# Patient Record
Sex: FEMALE | Race: BLACK OR AFRICAN AMERICAN | Employment: FULL TIME | URBAN - METROPOLITAN AREA
[De-identification: names, ages, dates, MRNs, and addresses within clinical notes are randomized per-mention and may not be internally consistent; named-entity substitution may affect disease eponyms.]

---

## 2019-05-14 ENCOUNTER — HOSPITAL ENCOUNTER (EMERGENCY)
Age: 39
Discharge: HOME | End: 2019-05-14
Attending: EMERGENCY MEDICINE
Payer: COMMERCIAL

## 2019-05-14 ENCOUNTER — APPOINTMENT (OUTPATIENT)
Dept: ULTRASOUND IMAGING | Age: 39
End: 2019-05-14
Attending: PHYSICIAN ASSISTANT
Payer: COMMERCIAL

## 2019-05-14 VITALS
HEIGHT: 67 IN | DIASTOLIC BLOOD PRESSURE: 97 MMHG | RESPIRATION RATE: 16 BRPM | OXYGEN SATURATION: 100 % | WEIGHT: 252.21 LBS | TEMPERATURE: 98.2 F | HEART RATE: 83 BPM | BODY MASS INDEX: 39.58 KG/M2 | SYSTOLIC BLOOD PRESSURE: 141 MMHG

## 2019-05-14 DIAGNOSIS — N93.9 ABNORMAL UTERINE BLEEDING: Primary | ICD-10-CM

## 2019-05-14 LAB
ANION GAP SERPL CALC-SCNC: 3 MMOL/L (ref 5–15)
APPEARANCE UR: CLEAR
BACTERIA URNS QL MICRO: NEGATIVE /HPF
BASOPHILS # BLD: 0.1 K/UL (ref 0–0.1)
BASOPHILS NFR BLD: 1 % (ref 0–1)
BILIRUB UR QL: NEGATIVE
BUN SERPL-MCNC: 8 MG/DL (ref 6–20)
BUN/CREAT SERPL: 11 (ref 12–20)
CALCIUM SERPL-MCNC: 8.4 MG/DL (ref 8.5–10.1)
CHLORIDE SERPL-SCNC: 105 MMOL/L (ref 97–108)
CO2 SERPL-SCNC: 30 MMOL/L (ref 21–32)
COLOR UR: NORMAL
CREAT SERPL-MCNC: 0.73 MG/DL (ref 0.55–1.02)
DIFFERENTIAL METHOD BLD: NORMAL
EOSINOPHIL # BLD: 0.2 K/UL (ref 0–0.4)
EOSINOPHIL NFR BLD: 3 % (ref 0–7)
EPITH CASTS URNS QL MICRO: NORMAL /LPF
ERYTHROCYTE [DISTWIDTH] IN BLOOD BY AUTOMATED COUNT: 12.9 % (ref 11.5–14.5)
GLUCOSE SERPL-MCNC: 93 MG/DL (ref 65–100)
GLUCOSE UR STRIP.AUTO-MCNC: NEGATIVE MG/DL
HCG UR QL: NEGATIVE
HCT VFR BLD AUTO: 35.9 % (ref 35–47)
HGB BLD-MCNC: 11.7 G/DL (ref 11.5–16)
HGB UR QL STRIP: NEGATIVE
HYALINE CASTS URNS QL MICRO: NORMAL /LPF (ref 0–5)
IMM GRANULOCYTES # BLD AUTO: 0 K/UL (ref 0–0.04)
IMM GRANULOCYTES NFR BLD AUTO: 0 % (ref 0–0.5)
KETONES UR QL STRIP.AUTO: NEGATIVE MG/DL
LEUKOCYTE ESTERASE UR QL STRIP.AUTO: NEGATIVE
LYMPHOCYTES # BLD: 2.5 K/UL (ref 0.8–3.5)
LYMPHOCYTES NFR BLD: 35 % (ref 12–49)
MCH RBC QN AUTO: 27 PG (ref 26–34)
MCHC RBC AUTO-ENTMCNC: 32.6 G/DL (ref 30–36.5)
MCV RBC AUTO: 82.9 FL (ref 80–99)
MONOCYTES # BLD: 0.6 K/UL (ref 0–1)
MONOCYTES NFR BLD: 9 % (ref 5–13)
NEUTS SEG # BLD: 3.6 K/UL (ref 1.8–8)
NEUTS SEG NFR BLD: 52 % (ref 32–75)
NITRITE UR QL STRIP.AUTO: NEGATIVE
NRBC # BLD: 0 K/UL (ref 0–0.01)
NRBC BLD-RTO: 0 PER 100 WBC
PH UR STRIP: 7.5 [PH] (ref 5–8)
PLATELET # BLD AUTO: 386 K/UL (ref 150–400)
PMV BLD AUTO: 10.4 FL (ref 8.9–12.9)
POTASSIUM SERPL-SCNC: 4 MMOL/L (ref 3.5–5.1)
PROT UR STRIP-MCNC: NEGATIVE MG/DL
RBC # BLD AUTO: 4.33 M/UL (ref 3.8–5.2)
RBC #/AREA URNS HPF: NORMAL /HPF (ref 0–5)
SODIUM SERPL-SCNC: 138 MMOL/L (ref 136–145)
SP GR UR REFRACTOMETRY: 1.02 (ref 1–1.03)
UA: UC IF INDICATED,UAUC: NORMAL
UROBILINOGEN UR QL STRIP.AUTO: 1 EU/DL (ref 0.2–1)
WBC # BLD AUTO: 7 K/UL (ref 3.6–11)
WBC URNS QL MICRO: NORMAL /HPF (ref 0–4)

## 2019-05-14 PROCEDURE — 80048 BASIC METABOLIC PNL TOTAL CA: CPT

## 2019-05-14 PROCEDURE — 99284 EMERGENCY DEPT VISIT MOD MDM: CPT

## 2019-05-14 PROCEDURE — 36415 COLL VENOUS BLD VENIPUNCTURE: CPT

## 2019-05-14 PROCEDURE — 81001 URINALYSIS AUTO W/SCOPE: CPT

## 2019-05-14 PROCEDURE — 76830 TRANSVAGINAL US NON-OB: CPT

## 2019-05-14 PROCEDURE — 81025 URINE PREGNANCY TEST: CPT

## 2019-05-14 PROCEDURE — 85025 COMPLETE CBC W/AUTO DIFF WBC: CPT

## 2019-05-14 RX ORDER — MEDROXYPROGESTERONE ACETATE 10 MG/1
10 TABLET ORAL DAILY
Qty: 10 TAB | Refills: 0 | Status: SHIPPED | OUTPATIENT
Start: 2019-05-14 | End: 2019-05-24

## 2019-05-15 NOTE — DISCHARGE INSTRUCTIONS
Patient Education        Abnormal Uterine Bleeding: Care Instructions  Your Care Instructions    Abnormal uterine bleeding (AUB) is irregular bleeding from the uterus that is longer or heavier than usual or does not occur at your regular time. Sometimes it is caused by changes in hormone levels. It can also be caused by growths in the uterus, such as fibroids or polyps. Sometimes a cause cannot be found. You may have heavy bleeding when you are not expecting your period. Your doctor may suggest a pregnancy test, if you think you are pregnant. Follow-up care is a key part of your treatment and safety. Be sure to make and go to all appointments, and call your doctor if you are having problems. It's also a good idea to know your test results and keep a list of the medicines you take. How can you care for yourself at home? · Be safe with medicines. Take pain medicines exactly as directed. ? If the doctor gave you a prescription medicine for pain, take it as prescribed. ? If you are not taking a prescription pain medicine, ask your doctor if you can take an over-the-counter medicine. · You may be low in iron because of blood loss. Eat a balanced diet that is high in iron and vitamin C. Foods rich in iron include red meat, shellfish, eggs, beans, and leafy green vegetables. Talk to your doctor about whether you need to take iron pills or a multivitamin. When should you call for help? Call 911 anytime you think you may need emergency care. For example, call if:    · You passed out (lost consciousness).    Call your doctor now or seek immediate medical care if:    · You have new or worse belly or pelvic pain.     · You have severe vaginal bleeding.     · You feel dizzy or lightheaded, or you feel like you may faint.    Watch closely for changes in your health, and be sure to contact your doctor if:    · You think you may be pregnant.     · Your bleeding gets worse.     · You do not get better as expected.    Where can you learn more? Go to http://mingo-ana.info/. Enter J726 in the search box to learn more about \"Abnormal Uterine Bleeding: Care Instructions. \"  Current as of: May 14, 2018  Content Version: 11.9  © 7608-3913 [employer]. Care instructions adapted under license by [employer] (which disclaims liability or warranty for this information). If you have questions about a medical condition or this instruction, always ask your healthcare professional. [place] [address] any warranty or liability for your use of this information.

## 2019-05-15 NOTE — ED PROVIDER NOTES
EMERGENCY DEPARTMENT HISTORY AND PHYSICAL EXAM 
 
 
Date: 5/14/2019 Patient Name: Chan Tavares History of Presenting Illness Chief Complaint Patient presents with  Vaginal Bleeding  
  pt reports vaginal bleeding x2 months History Provided By: Patient HPI: Chan Tavares, 42 y.o. female with PMHx significant for nothing, presents to the ED with cc of vaginal bleeding. Patient states that she has been having vaginal bleeding for the last 2 months. Notes that she typically has very heavy periods. States that she sees a OB/GYN but states that for unclear reason she has not been started on birth control to regulate her periods. States that she has been going through a pad every hour for the last 2 months, however notes that her bleeding has slowed down today. She does take iron supplements for iron deficiency anemia and notes some chronic fatigue that has not changed from prior. No chest pain, cough or cold symptoms, urinary symptoms. PCP: UNKNOWN There are no other complaints, changes, or physical findings at this time. Current Outpatient Medications Medication Sig Dispense Refill  ibuprofen (MOTRIN IB) 200 mg tablet Take  by mouth.  cyclobenzaprine (FLEXERIL) 5 mg tablet Take 1 Tab by mouth nightly. 30 Tab 0 Past History Past Medical History: No past medical history on file. Past Surgical History: No past surgical history on file. Family History: No family history on file. Social History: 
Social History Tobacco Use  Smoking status: Never Smoker Substance Use Topics  Alcohol use: Not on file  Drug use: Not on file Allergies: 
No Known Allergies Review of Systems Review of Systems Constitutional: Negative for chills and fever. HENT: Negative for congestion, rhinorrhea and sore throat. Respiratory: Negative for cough and shortness of breath. Cardiovascular: Negative for chest pain. Gastrointestinal: Negative for abdominal pain, nausea and vomiting. Genitourinary: Positive for vaginal bleeding. Negative for dysuria and urgency. Skin: Negative for rash. Neurological: Negative for dizziness, light-headedness and headaches. All other systems reviewed and are negative. Physical Exam  
Physical Exam  
Constitutional: She is oriented to person, place, and time. She appears well-developed and well-nourished. No distress. HENT:  
Head: Normocephalic and atraumatic. Eyes: Pupils are equal, round, and reactive to light. Conjunctivae and EOM are normal.  
Neck: Normal range of motion. Cardiovascular: Normal rate, regular rhythm and intact distal pulses. Pulmonary/Chest: Effort normal and breath sounds normal. No stridor. No respiratory distress. Abdominal: Soft. She exhibits no distension. There is no tenderness. Musculoskeletal: Normal range of motion. Neurological: She is alert and oriented to person, place, and time. Skin: Skin is warm and dry. Psychiatric: She has a normal mood and affect. Nursing note and vitals reviewed. Diagnostic Study Results Labs - Recent Results (from the past 12 hour(s)) URINALYSIS W/ REFLEX CULTURE Collection Time: 05/14/19  6:31 PM  
Result Value Ref Range Color YELLOW/STRAW Appearance CLEAR CLEAR Specific gravity 1.022 1.003 - 1.030    
 pH (UA) 7.5 5.0 - 8.0 Protein NEGATIVE  NEG mg/dL Glucose NEGATIVE  NEG mg/dL Ketone NEGATIVE  NEG mg/dL Bilirubin NEGATIVE  NEG Blood NEGATIVE  NEG Urobilinogen 1.0 0.2 - 1.0 EU/dL Nitrites NEGATIVE  NEG Leukocyte Esterase NEGATIVE  NEG    
 WBC 0-4 0 - 4 /hpf  
 RBC 0-5 0 - 5 /hpf Epithelial cells FEW FEW /lpf Bacteria NEGATIVE  NEG /hpf  
 UA:UC IF INDICATED CULTURE NOT INDICATED BY UA RESULT CNI Hyaline cast 0-2 0 - 5 /lpf  
HCG URINE, QL. - POC Collection Time: 05/14/19  6:35 PM  
Result Value Ref Range Pregnancy test,urine (POC) NEGATIVE  NEG    
CBC WITH AUTOMATED DIFF Collection Time: 05/14/19  7:42 PM  
Result Value Ref Range WBC 7.0 3.6 - 11.0 K/uL  
 RBC 4.33 3.80 - 5.20 M/uL  
 HGB 11.7 11.5 - 16.0 g/dL HCT 35.9 35.0 - 47.0 % MCV 82.9 80.0 - 99.0 FL  
 MCH 27.0 26.0 - 34.0 PG  
 MCHC 32.6 30.0 - 36.5 g/dL  
 RDW 12.9 11.5 - 14.5 % PLATELET 762 789 - 711 K/uL MPV 10.4 8.9 - 12.9 FL  
 NRBC 0.0 0  WBC ABSOLUTE NRBC 0.00 0.00 - 0.01 K/uL NEUTROPHILS 52 32 - 75 % LYMPHOCYTES 35 12 - 49 % MONOCYTES 9 5 - 13 % EOSINOPHILS 3 0 - 7 % BASOPHILS 1 0 - 1 % IMMATURE GRANULOCYTES 0 0.0 - 0.5 % ABS. NEUTROPHILS 3.6 1.8 - 8.0 K/UL  
 ABS. LYMPHOCYTES 2.5 0.8 - 3.5 K/UL  
 ABS. MONOCYTES 0.6 0.0 - 1.0 K/UL  
 ABS. EOSINOPHILS 0.2 0.0 - 0.4 K/UL  
 ABS. BASOPHILS 0.1 0.0 - 0.1 K/UL  
 ABS. IMM. GRANS. 0.0 0.00 - 0.04 K/UL  
 DF AUTOMATED METABOLIC PANEL, BASIC Collection Time: 05/14/19  7:42 PM  
Result Value Ref Range Sodium 138 136 - 145 mmol/L Potassium 4.0 3.5 - 5.1 mmol/L Chloride 105 97 - 108 mmol/L  
 CO2 30 21 - 32 mmol/L Anion gap 3 (L) 5 - 15 mmol/L Glucose 93 65 - 100 mg/dL BUN 8 6 - 20 MG/DL Creatinine 0.73 0.55 - 1.02 MG/DL  
 BUN/Creatinine ratio 11 (L) 12 - 20 GFR est AA >60 >60 ml/min/1.73m2 GFR est non-AA >60 >60 ml/min/1.73m2 Calcium 8.4 (L) 8.5 - 10.1 MG/DL Radiologic Studies -  
US TRANSVAGINAL Final Result IMPRESSION:   
1. Transvaginal pelvic ultrasound revealing tiny amount of fluid in the  
endometrium. Minimal blood or polyp in the endocervical canal. Correlate with  
physical examination. Uterine fundal leiomyoma. Normal ovaries bilaterally. Us Transvaginal 
 
Result Date: 5/14/2019 IMPRESSION: 1. Transvaginal pelvic ultrasound revealing tiny amount of fluid in the endometrium. Minimal blood or polyp in the endocervical canal. Correlate with physical examination. Uterine fundal leiomyoma. Normal ovaries bilaterally. Medical Decision Making I am the first provider for this patient. I reviewed the vital signs, available nursing notes, past medical history, past surgical history, family history and social history. Vital Signs-Reviewed the patient's vital signs. Patient Vitals for the past 12 hrs: 
 Temp Pulse Resp BP SpO2  
05/14/19 1820 98.2 °F (36.8 °C) 83 16 (!) 141/97 100 % Records Reviewed: Nursing Notes Provider Notes (Medical Decision Making):  
Patient presents with dysfunctional uterine bleeding. On exam, she is well-appearing with stable vital signs. Plan for basic lab work to rule out significant anemia related to her vaginal bleeding. We will also check pelvic ultrasound. ED Course:  
Initial assessment performed. The patients presenting problems have been discussed, and they are in agreement with the care plan formulated and outlined with them. I have encouraged them to ask questions as they arise throughout their visit. Hemoglobin is stable. Pelvic ultrasound shows blood versus a cervical polyp. Patient offered pelvic exam and declined stating that she will follow-up with OB/GYN. Will start her on Provera strict instructions to follow-up with OB for further management. She does request the name of a new OB/GYN which I will provide for her. Return precautions were discussed. Critical Care: 
none Disposition: 
Discharge Note: 
9:24 PM 
The patient has been re-evaluated and is ready for discharge. Reviewed available results with patient. Counseled patient on diagnosis and care plan. Patient has expressed understanding, and all questions have been answered. Patient agrees with plan and agrees to follow up as recommended, or to return to the ED if their symptoms worsen. Discharge instructions have been provided and explained to the patient, along with reasons to return to the ED. PLAN: 
1.   
Discharge Medication List as of 5/14/2019  9:24 PM  
 START taking these medications Details  
medroxyPROGESTERone (PROVERA) 10 mg tablet Take 1 Tab by mouth daily for 10 days. , Print, Disp-10 Tab, R-0  
  
  
CONTINUE these medications which have NOT CHANGED Details  
ibuprofen (MOTRIN IB) 200 mg tablet Take  by mouth., Historical Med  
  
cyclobenzaprine (FLEXERIL) 5 mg tablet Take 1 Tab by mouth nightly., Normal, Disp-30 Tab, R-0  
  
  
 
2. Follow-up Information Follow up With Specialties Details Why Contact Info Julia Beck MD Obstetrics & Gynecology, Gynecology, Obstetrics Schedule an appointment as soon as possible for a visit  [address] [place] [address] 
[phone] [facility] EMERGENCY DEPT Emergency Medicine  As needed, If symptoms worsen [address] [place] [address] Drive [address] [facility] 
[phone] Return to ED if worse Diagnosis Clinical Impression: 1. Abnormal uterine bleeding This note will not be viewable in [address]

## 2019-05-15 NOTE — ED NOTES
Dr Mera Fabian,  has reviewed discharge instructions with the patient. The patient verbalized understanding. Pt. A&Ox4, respirations even and unlabored. VS stable as noted in flowsheet. Pt Declined wheelchair assist from department; paperwork in hand.

## 2020-02-07 ENCOUNTER — HOSPITAL ENCOUNTER (EMERGENCY)
Age: 40
Discharge: HOME | End: 2020-02-07
Attending: EMERGENCY MEDICINE
Payer: COMMERCIAL

## 2020-02-07 VITALS
WEIGHT: 237.44 LBS | OXYGEN SATURATION: 100 % | RESPIRATION RATE: 16 BRPM | HEART RATE: 72 BPM | HEIGHT: 65 IN | BODY MASS INDEX: 39.56 KG/M2 | TEMPERATURE: 97.9 F | DIASTOLIC BLOOD PRESSURE: 80 MMHG | SYSTOLIC BLOOD PRESSURE: 142 MMHG

## 2020-02-07 DIAGNOSIS — K08.89 PAIN, DENTAL: Primary | ICD-10-CM

## 2020-02-07 PROCEDURE — 99283 EMERGENCY DEPT VISIT LOW MDM: CPT

## 2020-02-07 PROCEDURE — 75810000283 HC INJECTION NERVE BLOCK

## 2020-02-07 PROCEDURE — 74011250637 HC RX REV CODE- 250/637: Performed by: EMERGENCY MEDICINE

## 2020-02-07 PROCEDURE — 74011000250 HC RX REV CODE- 250: Performed by: EMERGENCY MEDICINE

## 2020-02-07 RX ORDER — BUPIVACAINE HYDROCHLORIDE 5 MG/ML
2 INJECTION, SOLUTION EPIDURAL; INTRACAUDAL ONCE
Status: COMPLETED | OUTPATIENT
Start: 2020-02-07 | End: 2020-02-07

## 2020-02-07 RX ORDER — PENICILLIN V POTASSIUM 500 MG/1
500 TABLET, FILM COATED ORAL 4 TIMES DAILY
Qty: 28 TAB | Refills: 0 | Status: SHIPPED | OUTPATIENT
Start: 2020-02-07 | End: 2020-02-14

## 2020-02-07 RX ORDER — NAPROXEN 500 MG/1
500 TABLET ORAL
Qty: 20 TAB | Refills: 0 | Status: SHIPPED | OUTPATIENT
Start: 2020-02-07 | End: 2021-05-05 | Stop reason: ALTCHOICE

## 2020-02-07 RX ORDER — PENICILLIN V POTASSIUM 500 MG/1
500 TABLET, FILM COATED ORAL 4 TIMES DAILY
Qty: 28 TAB | Refills: 0 | OUTPATIENT
Start: 2020-02-07 | End: 2020-02-07

## 2020-02-07 RX ORDER — NAPROXEN 500 MG/1
500 TABLET ORAL
Qty: 20 TAB | Refills: 0 | OUTPATIENT
Start: 2020-02-07 | End: 2020-02-07

## 2020-02-07 RX ORDER — GABAPENTIN 100 MG/1
100 CAPSULE ORAL ONCE
Status: COMPLETED | OUTPATIENT
Start: 2020-02-07 | End: 2020-02-07

## 2020-02-07 RX ADMIN — BUPIVACAINE HYDROCHLORIDE 2 MG: 5 INJECTION, SOLUTION EPIDURAL; INTRACAUDAL at 06:00

## 2020-02-07 RX ADMIN — GABAPENTIN 100 MG: 100 CAPSULE ORAL at 06:00

## 2020-02-07 NOTE — DISCHARGE INSTRUCTIONS
Patient Education   Emergency [address]  [address] [facility], [address]   Phone: [phone]    [facility] [address], [facility]   Phone: [phone], select option (2) to confirm time for treatment     [facility]  [address] [facility], [facility]   Monday-Friday: 8am-4pm  Phone: [phone] of Dentistry Urgent [address] [facility], [address] [facility], [facility] [place] [address]  Phone: [phone] to confirm a time for emergency treatment  Pediatrics: [phone]     [facility]  Phone: [phone]    [facility]  [address], [address] [address]   Phone [phone] or [phone]  Hours [id number] (extractions)  Simple tooth extraction: $60 per tooth, $55 per x-ray     Irina Aleman the [facility] (in [place])  [facility] only  Phone: [phone], leave message saying you need an appointment to register  Hours: Wed 6-9p     [facility]  Disabled and over age 58 only  Phone: [phone]    Non-Urgent [facility] ([facility])  [address] [facility], [place], [facility] [address]  Phone: [phone]     [facility] [address] [facility] [address] [facility]   Dental Clinic: [phone]  Oral Surgery Clinic: [phone]         Local Dentists    [facility]  [address] [facility] [address]  [facility] Sweta Rosales DDS  [facility]    Radu Villafana, DDS  [facility] [address]  [phone]    Uriah Pro  [address] [facility]  [phone]               Tooth and Gum Pain: Care Instructions  Your Care Instructions    The most common causes of dental pain are tooth decay and gum disease. Pain can also be caused by an infection of the tooth (abscess) or the gums. Or you may have pain from a broken or cracked tooth.  Other causes of pain include infection and damage to a tooth from nervous grinding of your teeth. A wisdom tooth can be painful when it is coming in but cannot break through the gum. It can also be painful when the tooth is only partway in and extra gum tissue has formed around it. The tissue can get inflamed (pericoronitis), and sometimes it gets infected. Prompt dental care can help find the cause of your toothache and keep the tooth from dying or gum disease from getting worse. Self-care at home may reduce your pain and discomfort. Follow-up care is a key part of your treatment and safety. Be sure to make and go to all appointments, and call your dentist or doctor if you are having problems. It's also a good idea to know your test results and keep a list of the medicines you take. How can you care for yourself at home? · To reduce pain and facial swelling, put an ice or cold pack on the outside of your cheek for 10 to 20 minutes at a time. Put a thin cloth between the ice and your skin. Do not use heat. · If your doctor prescribed antibiotics, take them as directed. Do not stop taking them just because you feel better. You need to take the full course of antibiotics. · Ask your doctor if you can take an over-the-counter pain medicine, such as acetaminophen (Tylenol), ibuprofen (Advil, Motrin), or naproxen (Aleve). Be safe with medicines. Read and follow all instructions on the label. · Avoid very hot, cold, or sweet foods and drinks if they increase your pain. · Rinse your mouth with warm salt water every 2 hours to help relieve pain and swelling. Mix 1 teaspoon of salt in 8 ounces of water. · Talk to your dentist about using special toothpaste for sensitive teeth. To reduce pain on contact with heat or cold or when brushing, brush with this toothpaste regularly or rub a small amount of the paste on the sensitive area with a clean finger 2 or 3 times a day. Floss gently between your teeth.   · Do not smoke or use spit tobacco. Tobacco use can make gum problems worse, decreases your ability to fight infection in your gums, and delays healing. If you need help quitting, talk to your doctor about stop-smoking programs and medicines. These can increase your chances of quitting for good. When should you call for help? Call 911 anytime you think you may need emergency care. For example, call if:    · You have trouble breathing.    Call your dentist or doctor now or seek immediate medical care if:    · You have signs of infection, such as:  ? Increased pain, swelling, warmth, or redness. ? Red streaks leading from the area. ? Pus draining from the area. ? A fever.    Watch closely for changes in your health, and be sure to contact your doctor if:    · You do not get better as expected. Where can you learn more? Go to http://mingo-ana.info/. Enter 0363 [id number] in the search box to learn more about \"Tooth and Gum Pain: Care Instructions. \"  Current as of: October 3, 2018  Content Version: 12.2  © 1605-3265 [employer]. Care instructions adapted under license by [employer] (which disclaims liability or warranty for this information). If you have questions about a medical condition or this instruction, always ask your healthcare professional. [address] any warranty or liability for your use of this information.

## 2020-02-07 NOTE — ED TRIAGE NOTES
Assumed care of patient. Patient reports history of four teeth removed to upper right mouth in November. Reports lower right mouth pain started x3 days ago. Reports increased swelling, cold sensitivity, and radiating pain to jaw region. Denies drainage or broken teeth. AxOx4. Swelling noted to exterior of right lower jaw. Redness and swelling to right lower gum line.

## 2020-02-07 NOTE — ED TRIAGE NOTES
Pt states she has had severe mouth/gum pan for 3 days and thinks it may be related to having a 4 teeth pulled in November. Pt states she saw dentist and was told they could not find anything wrong and was told if not better to see the dentist that pulled the teeth.

## 2020-02-07 NOTE — ED PROVIDER NOTES
EMERGENCY DEPARTMENT HISTORY AND PHYSICAL EXAM      Date: 2/7/2020  Patient Name: Leatha Arnold    History of Presenting Illness     Chief Complaint   Patient presents with    Dental Pain       History Provided By: Patient    HPI: Leatha Arnold, 42 y.o. female with PMHx as noted below presents the emergency department with chief complaint of dental pain. Patient notes pain in the gum adjacent to her first and second molars on the lower right side. She noted the pain became more severe yesterday and has been constant. Describes it as an aching sensation that is worse with eating and chewing. Somewhat relieved by over-the-counter medications. No neck stiffness, fevers, chills, focal neurologic deficits, headaches. PCP: UNKNOWN    Current Outpatient Medications   Medication Sig Dispense Refill    naproxen (NAPROSYN) 500 mg tablet Take 1 Tab by mouth every twelve (12) hours as needed for Pain. 20 Tab 0    penicillin v potassium (VEETID) 500 mg tablet Take 1 Tab by mouth four (4) times daily for 7 days. 28 Tab 0    ibuprofen (MOTRIN IB) 200 mg tablet Take  by mouth.  cyclobenzaprine (FLEXERIL) 5 mg tablet Take 1 Tab by mouth nightly. 30 Tab 0       Past History     Past Medical History:  Reviewed, no pertinent past medical history      Social History:  Denies heavy alcohol or illicit drugs  Allergies:  No Known Allergies      Review of Systems   Review of Systems  Constitutional: Negative for fever, chills, and fatigue. HENT: Negative for congestion, sore throa and neck stiffness   Eyes: Negative for discharge and redness. Respiratory: Negative for  shortness of breath, wheezing   Cardiovascular: Negative for chest pain, palpitations   Gastrointestinal: Negative for nausea, vomiting, abdominal pain, constipation,   Genitourinary: Negative for dysuria, hematuria,   Musculoskeletal: Negative for myalgias or joint pain . Skin: Negative for rash or lesions .    Neurological: Negative weakness, light-headedness, numbness and headaches. Physical Exam   Physical Exam    GENERAL: alert and oriented, no acute distress  EYES: PEERL, No injection, discharge or icterus. ENT: Mucous membranes pink and moist.  Soft tissues within normal limits, face is symmetric, no fluctuance or erythema of the gingiva  NECK: Supple  LUNGS: Airway patent. Non-labored respirations. Breath sounds clear with good air entry bilaterally. HEART: Regular rate and rhythm. No peripheral edema  ABDOMEN: Non-distended and non-tender, without guarding or rebound. SKIN:  warm, dry  MSK/EXTREMITIES: Without swelling, tenderness or deformity, symmetric with normal ROM  NEUROLOGICAL: Alert, oriented      Diagnostic Study Results     Labs -   No results found for this or any previous visit (from the past 12 hour(s)). Radiologic Studies -   No orders to display     CT Results  (Last 48 hours)    None        CXR Results  (Last 48 hours)    None            Medical Decision Making   I am the first provider for this patient. I reviewed the vital signs, available nursing notes, past medical history, past surgical history, family history and social history. Vital Signs-Reviewed the patient's vital signs. Patient Vitals for the past 12 hrs:   Temp Pulse Resp BP SpO2   02/07/20 0657 97.9 °F (36.6 °C) 72 16 142/80 100 %   02/07/20 0519 97.6 °F (36.4 °C) 77 17 (!) 145/96 100 %           Records Reviewed: Nursing Notes and Old Medical Records    Provider Notes (Medical Decision Making): On presentation the patient is well appearing, in no acute distress with normal vital signs. Based on the history and exam the differential diagnosis for this patient includes pulpitis, dental carries, tooth decay, dental abscess. No signs of abscess on exam. There is no trismus, soft tissue is within normal limits, the uvula is midline, there is no meningismus and no gross neurologic deficits observed.  Supportive care discussed and patient instructed to follow up with a dentist. Referral form provided. Patient will be started on antibiotics      ED Course:   Initial assessment performed. The patients presenting problems have been discussed, and they are in agreement with the care plan formulated and outlined with them. I have encouraged them to ask questions as they arise throughout their visit. Procedure Note - Dental Block:    Performed by Cande Garg MD  A inferior alveolar nerve block was performed on the right side. 2 mL of 0.5% bupivicaine was injected after negative aspiration. Total time at bedside, performing this procedure was 1-15 minutes. The procedure was tolerated well without any complications. PROGRESS NOTE:  The patient has been re-evaluated and is ready for discharge. Reviewed available results with patient and have counseled them on diagnosis and care plan. They have expressed understanding, and all their questions have been answered. They agree with plan and agree to have pt F/U as recommended, or return to the ED if their sxs worsen. Discharge instructions have been provided and explained to them, along with reasons to have pt return to the ED. The patient is amenable to discharge so will discharge pt at this time      Disposition:  home    PLAN:  1. Discharge Medication List as of 2/7/2020  7:07 AM      START taking these medications    Details   naproxen (NAPROSYN) 500 mg tablet Take 1 Tab by mouth every twelve (12) hours as needed for Pain., Normal, Disp-20 Tab, R-0      penicillin v potassium (VEETID) 500 mg tablet Take 1 Tab by mouth four (4) times daily for 7 days. , Normal, Disp-28 Tab, R-0         CONTINUE these medications which have NOT CHANGED    Details   ibuprofen (MOTRIN IB) 200 mg tablet Take  by mouth., Historical Med      cyclobenzaprine (FLEXERIL) 5 mg tablet Take 1 Tab by mouth nightly., Normal, Disp-30 Tab, R-0           2.    Follow-up Information     Follow up With Specialties Details Why Contact Info    [facility] EMERGENCY DEPT Emergency Medicine  If symptoms worsen [address] [address]  [address] [facility] Dent[facility] Schedule an appointment as soon as possible for a visit in 1 day  [facility]  [phone]        Return to ED if worse     Diagnosis     Clinical Impression:   1. Pain, dental        Please note that this dictation was completed with Dragon, computer voice recognition software. Quite often unanticipated grammatical, syntax, homophones, and other interpretive errors are inadvertently transcribed by the computer software. Please disregard these errors. Additionally, please excuse any errors that have escaped final proofreading.

## 2020-02-07 NOTE — ED NOTES
[facility]. Pt is resting in bed with no complaints at this time, no acute distress noted. Pt reports decreased pain after dental block    0720  D/C papers given to and signed by pt, verbalizes understanding.   Pt D/C home with steady gait

## 2020-10-21 ENCOUNTER — OFFICE VISIT (OUTPATIENT)
Dept: NEUROLOGY | Age: 40
End: 2020-10-21
Payer: COMMERCIAL

## 2020-10-21 VITALS
OXYGEN SATURATION: 99 % | WEIGHT: 237 LBS | DIASTOLIC BLOOD PRESSURE: 80 MMHG | SYSTOLIC BLOOD PRESSURE: 110 MMHG | HEART RATE: 88 BPM | BODY MASS INDEX: 39.44 KG/M2

## 2020-10-21 DIAGNOSIS — G50.0 TRIGEMINAL NEURALGIA OF RIGHT SIDE OF FACE: Primary | ICD-10-CM

## 2020-10-21 PROCEDURE — 99205 OFFICE O/P NEW HI 60 MIN: CPT | Performed by: PSYCHIATRY & NEUROLOGY

## 2020-10-21 RX ORDER — PREDNISOLONE ACETATE 10 MG/ML
1 SUSPENSION/ DROPS OPHTHALMIC 4 TIMES DAILY
COMMUNITY

## 2020-10-21 RX ORDER — OXCARBAZEPINE 300 MG/1
TABLET, FILM COATED ORAL
COMMUNITY
End: 2020-10-21 | Stop reason: DRUGHIGH

## 2020-10-21 RX ORDER — BACLOFEN 10 MG/1
5 TABLET ORAL 3 TIMES DAILY
Qty: 45 TAB | Refills: 2 | Status: SHIPPED | OUTPATIENT
Start: 2020-10-21 | End: 2021-02-02

## 2020-10-21 RX ORDER — FLUTICASONE PROPIONATE 50 MCG
2 SPRAY, SUSPENSION (ML) NASAL DAILY
COMMUNITY

## 2020-10-21 RX ORDER — CARBAMAZEPINE 100 MG/1
100 TABLET, EXTENDED RELEASE ORAL 2 TIMES DAILY
COMMUNITY
End: 2020-10-21 | Stop reason: DRUGHIGH

## 2020-10-21 RX ORDER — CHOLECALCIFEROL TAB 125 MCG (5000 UNIT) 125 MCG
5000 TAB ORAL DAILY
COMMUNITY

## 2020-10-21 RX ORDER — ASCORBIC ACID 250 MG
TABLET ORAL
COMMUNITY

## 2020-10-21 RX ORDER — MONTELUKAST SODIUM 10 MG/1
10 TABLET ORAL DAILY
COMMUNITY

## 2020-10-21 RX ORDER — LANOLIN ALCOHOL/MO/W.PET/CERES
1000 CREAM (GRAM) TOPICAL DAILY
COMMUNITY

## 2020-10-21 RX ORDER — OXCARBAZEPINE 600 MG/1
600 TABLET, FILM COATED ORAL 2 TIMES DAILY
Qty: 180 TAB | Refills: 1 | Status: SHIPPED | OUTPATIENT
Start: 2020-10-21 | End: 2021-02-02 | Stop reason: SDUPTHER

## 2020-10-21 RX ORDER — CETIRIZINE HYDROCHLORIDE 10 MG/1
CAPSULE, LIQUID FILLED ORAL
COMMUNITY

## 2020-10-21 NOTE — PROGRESS NOTES
Neurology Note    Chief Complaint   Patient presents with   [facility] Patient     nerve shock right side    Numbness       HPI/Subjective  Manish Burgess is a 47 y.o. female who presented with right facial pain. She had her tooth pulled out in April 2019 and she was intermittent pain in the right upper jaw. It gradually worsened and now she is having pain in both the upper and lower jaw. It is a shock like sensation. It is 10/10 in severity. It radiates to the jaw and the eye. She does have problem with chewing and brushing. Her dentist did Xray and did not find a problem with teeth. Current Outpatient Medications   Medication Sig    fluticasone propionate (Flonase Allergy Relief) 50 mcg/actuation nasal spray 2 Sprays by Both Nostrils route daily.  Cetirizine (ZyrTEC) 10 mg cap Take  by mouth.  prednisoLONE acetate (PRED FORTE) 1 % ophthalmic suspension Administer 1 Drop to both eyes four (4) times daily.  montelukast (SINGULAIR) 10 mg tablet Take 10 mg by mouth daily.  ascorbic acid, vitamin C, (Vitamin C) 250 mg tablet Take  by mouth.  cyanocobalamin 1,000 mcg tablet Take 1,000 mcg by mouth daily.  cholecalciferol (VITAMIN D3) (5000 Units/125 mcg) tab tablet Take 5,000 mcg by mouth daily. Taking once a week    baclofen (LIORESAL) 10 mg tablet Take 0.5 Tabs by mouth three (3) times daily.  OXcarbazepine (TRILEPTAL) 600 mg tablet Take 1 Tab by mouth two (2) times a day.  naproxen (NAPROSYN) 500 mg tablet Take 1 Tab by mouth every twelve (12) hours as needed for Pain.  ibuprofen (MOTRIN IB) 200 mg tablet Take  by mouth.  cyclobenzaprine (FLEXERIL) 5 mg tablet Take 1 Tab by mouth nightly. No current facility-administered medications for this visit. No Known Allergies  Past Medical History:   Diagnosis Date    Neuropathy      No past surgical history on file. No family history on file.   Social History     Tobacco Use    Smoking status: Never Smoker   Substance Use Topics  Alcohol use: Not on file    Drug use: Not on file       REVIEW OF SYSTEMS:   A ten system review of constitutional, cardiovascular, respiratory, musculoskeletal, endocrine, skin, SHEENT, genitourinary, psychiatric and neurologic systems was obtained and is unremarkable with the exception of leg swelling, nausea and vomiting and shortness of breath    EXAMINATION:   Visit Vitals  /80   Pulse 88   Wt 237 lb (107.5 kg)   SpO2 99%   BMI 39.44 kg/m²        General:   General appearance: Pt is in no acute distress   Distal pulses are preserved  Fundoscopic Exam: Normal    Neurological Examination:   Mental Status: AAO x3. Speech is fluent. Follows commands, has normal fund of knowledge, attention, short term recall, comprehension and insight. Cranial Nerves: Visual fields are full. PERRL, Extraocular movements are full. Facial sensation intact. Facial movement intact. Hearing intact to conversation. Palate elevates symmetrically. Shoulder shrug symmetric. Tongue midline. Motor: Strength is 5/5 in all 4 ext. No atrophy. Tone: Normal    Sensation: Light touch - Normal    Reflexes: DTRs 2+ throughout. Coordination/Cerebellar: Intact to finger-nose-finger     Gait: Casual gait is normal.     Skin: No significant bruising or lacerations. Laboratory review:   Results for orders placed or performed during the hospital encounter of 05/14/19   CBC WITH AUTOMATED DIFF   Result Value Ref Range    WBC 7.0 3.6 - 11.0 K/uL    RBC 4.33 3.80 - 5.20 M/uL    HGB 11.7 11.5 - 16.0 g/dL    HCT 35.9 35.0 - 47.0 %    MCV 82.9 80.0 - 99.0 FL    MCH 27.0 26.0 - 34.0 PG    MCHC 32.6 30.0 - 36.5 g/dL    RDW 12.9 11.5 - 14.5 %    PLATELET 684 083 - 914 K/uL    MPV 10.4 8.9 - 12.9 FL    NRBC 0.0 0  WBC    ABSOLUTE NRBC 0.00 0.00 - 0.01 K/uL    NEUTROPHILS 52 32 - 75 %    LYMPHOCYTES 35 12 - 49 %    MONOCYTES 9 5 - 13 %    EOSINOPHILS 3 0 - 7 %    BASOPHILS 1 0 - 1 %    IMMATURE GRANULOCYTES 0 0.0 - 0.5 %    ABS. NEUTROPHILS 3.6 1.8 - 8.0 K/UL    ABS. LYMPHOCYTES 2.5 0.8 - 3.5 K/UL    ABS. MONOCYTES 0.6 0.0 - 1.0 K/UL    ABS. EOSINOPHILS 0.2 0.0 - 0.4 K/UL    ABS. BASOPHILS 0.1 0.0 - 0.1 K/UL    ABS. IMM. GRANS. 0.0 0.00 - 0.04 K/UL    DF AUTOMATED     METABOLIC PANEL, BASIC   Result Value Ref Range    Sodium 138 136 - 145 mmol/L    Potassium 4.0 3.5 - 5.1 mmol/L    Chloride 105 97 - 108 mmol/L    CO2 30 21 - 32 mmol/L    Anion gap 3 (L) 5 - 15 mmol/L    Glucose 93 65 - 100 mg/dL    BUN 8 6 - 20 MG/DL    Creatinine 0.73 0.55 - 1.02 MG/DL    BUN/Creatinine ratio 11 (L) 12 - 20      GFR est AA >60 >60 ml/min/1.73m2    GFR est non-AA >60 >60 ml/min/1.73m2    Calcium 8.4 (L) 8.5 - 10.1 MG/DL   URINALYSIS W/ REFLEX CULTURE    Specimen: Urine   Result Value Ref Range    Color YELLOW/STRAW      Appearance CLEAR CLEAR      Specific gravity 1.022 1.003 - 1.030      pH (UA) 7.5 5.0 - 8.0      Protein NEGATIVE  NEG mg/dL    Glucose NEGATIVE  NEG mg/dL    Ketone NEGATIVE  NEG mg/dL    Bilirubin NEGATIVE  NEG      Blood NEGATIVE  NEG      Urobilinogen 1.0 0.2 - 1.0 EU/dL    Nitrites NEGATIVE  NEG      Leukocyte Esterase NEGATIVE  NEG      WBC 0-4 0 - 4 /hpf    RBC 0-5 0 - 5 /hpf    Epithelial cells FEW FEW /lpf    Bacteria NEGATIVE  NEG /hpf    UA:UC IF INDICATED CULTURE NOT INDICATED BY UA RESULT CNI      Hyaline cast 0-2 0 - 5 /lpf   HCG URINE, QL. - POC   Result Value Ref Range    Pregnancy test,urine (POC) NEGATIVE  NEG         Imaging review:  None    Documentation review:  None    Assessment/Plan:   Kitty Chand is a 47 y.o. female who presented with right facial pain that started after she did have right upper molar pulled out. This happened in April 2019 but the pain has been gradually getting worse. Based on the description she does have right trigeminal neuralgia. She is taking Trileptal and Tegretol together. I am going to increase the dosage of Trileptal to 600 mg p.o. twice daily. Discontinue Tegretol.   I am also going to start her on baclofen 5 mg p.o. 3 times daily. We we will get MRI of the brain to check for tumors, blood vessel compressing on the trigeminal nerve on the right. Follow-up in 3 months    No flowsheet data found. Primary care to address possible depression if PHQ-9 score is more than 9. ICD-10-CM ICD-9-CM    1. Trigeminal neuralgia of right side of face  G50.0 350.1 MRI BRAIN W WO CONT      baclofen (LIORESAL) 10 mg tablet      OXcarbazepine (TRILEPTAL) 600 mg tablet      Thank you for allowing me to participate in the care of Ms. Oakley. Please feel free to contact me if you have any questions. Electronically signed. Shanique Oviedo MD  Neurologist    CC: Freya Hayes MD  Fax: [phone]    This note was created using voice recognition software. Despite editing, there may be syntax errors.

## 2020-10-26 ENCOUNTER — TELEPHONE (OUTPATIENT)
Dept: NEUROLOGY | Age: 40
End: 2020-10-26

## 2020-10-28 ENCOUNTER — HOSPITAL ENCOUNTER (OUTPATIENT)
Dept: MRI IMAGING | Age: 40
Discharge: HOME | End: 2020-10-28
Attending: PSYCHIATRY & NEUROLOGY
Payer: COMMERCIAL

## 2020-10-28 DIAGNOSIS — G50.0 TRIGEMINAL NEURALGIA OF RIGHT SIDE OF FACE: ICD-10-CM

## 2020-10-28 PROCEDURE — 74011250636 HC RX REV CODE- 250/636: Performed by: PSYCHIATRY & NEUROLOGY

## 2020-10-28 PROCEDURE — 70553 MRI BRAIN STEM W/O & W/DYE: CPT

## 2020-10-28 PROCEDURE — A9575 INJ GADOTERATE MEGLUMI 0.1ML: HCPCS | Performed by: PSYCHIATRY & NEUROLOGY

## 2020-10-28 RX ORDER — GADOTERATE MEGLUMINE 376.9 MG/ML
20 INJECTION INTRAVENOUS
Status: COMPLETED | OUTPATIENT
Start: 2020-10-28 | End: 2020-10-28

## 2020-10-28 RX ADMIN — GADOTERATE MEGLUMINE 20 ML: 376.9 INJECTION INTRAVENOUS at 18:45

## 2021-02-02 ENCOUNTER — OFFICE VISIT (OUTPATIENT)
Dept: NEUROLOGY | Age: 41
End: 2021-02-02
Payer: COMMERCIAL

## 2021-02-02 VITALS
RESPIRATION RATE: 16 BRPM | WEIGHT: 239 LBS | SYSTOLIC BLOOD PRESSURE: 132 MMHG | TEMPERATURE: 97.7 F | DIASTOLIC BLOOD PRESSURE: 74 MMHG | OXYGEN SATURATION: 99 % | BODY MASS INDEX: 39.82 KG/M2 | HEIGHT: 65 IN | HEART RATE: 65 BPM

## 2021-02-02 DIAGNOSIS — M54.41 ACUTE BILATERAL LOW BACK PAIN WITH BILATERAL SCIATICA: ICD-10-CM

## 2021-02-02 DIAGNOSIS — M54.42 ACUTE BILATERAL LOW BACK PAIN WITH BILATERAL SCIATICA: ICD-10-CM

## 2021-02-02 DIAGNOSIS — G50.0 TRIGEMINAL NEURALGIA OF RIGHT SIDE OF FACE: Primary | ICD-10-CM

## 2021-02-02 PROCEDURE — 99214 OFFICE O/P EST MOD 30 MIN: CPT | Performed by: PSYCHIATRY & NEUROLOGY

## 2021-02-02 RX ORDER — OXCARBAZEPINE 600 MG/1
600 TABLET, FILM COATED ORAL 2 TIMES DAILY
Qty: 180 TAB | Refills: 1 | Status: SHIPPED | OUTPATIENT
Start: 2021-02-02 | End: 2021-11-02 | Stop reason: SDUPTHER

## 2021-02-02 RX ORDER — BACLOFEN 10 MG/1
10 TABLET ORAL 3 TIMES DAILY
Qty: 270 TAB | Refills: 1 | Status: SHIPPED | OUTPATIENT
Start: 2021-02-02 | End: 2021-08-18 | Stop reason: SDUPTHER

## 2021-02-02 NOTE — PROGRESS NOTES
Neurology Note    Chief Complaint   Patient presents with    Follow-up    Pain (Chronic)     pain in tongue and right side of jaw     Medication Refill     flexiril does help        HPI/Subjective  Flower Ron is a 47 y.o. female who presented with right facial pain. She had her tooth pulled out in April 2019 and she was intermittent pain in the right upper jaw. It gradually worsened and now she is having pain in both the upper and lower jaw. It is a shock like sensation. It is 10/10 in severity. It radiates to the jaw and the eye. She does have problem with chewing and brushing. Her dentist did Xray and did not find a problem with teeth. Interval history:  MRI brain is normal  She is having increasing pain over the last 2 months      Current Outpatient Medications   Medication Sig    fluticasone propionate (Flonase Allergy Relief) 50 mcg/actuation nasal spray 2 Sprays by Both Nostrils route daily.  Cetirizine (ZyrTEC) 10 mg cap Take  by mouth.  prednisoLONE acetate (PRED FORTE) 1 % ophthalmic suspension Administer 1 Drop to both eyes four (4) times daily.  montelukast (SINGULAIR) 10 mg tablet Take 10 mg by mouth daily.  ascorbic acid, vitamin C, (Vitamin C) 250 mg tablet Take  by mouth.  cyanocobalamin 1,000 mcg tablet Take 1,000 mcg by mouth daily.  cholecalciferol (VITAMIN D3) (5000 Units/125 mcg) tab tablet Take 5,000 mcg by mouth daily. Taking once a week    baclofen (LIORESAL) 10 mg tablet Take 0.5 Tabs by mouth three (3) times daily.  OXcarbazepine (TRILEPTAL) 600 mg tablet Take 1 Tab by mouth two (2) times a day.  naproxen (NAPROSYN) 500 mg tablet Take 1 Tab by mouth every twelve (12) hours as needed for Pain.  ibuprofen (MOTRIN IB) 200 mg tablet Take  by mouth.  cyclobenzaprine (FLEXERIL) 5 mg tablet Take 1 Tab by mouth nightly. No current facility-administered medications for this visit.       No Known Allergies  Past Medical History:   Diagnosis Date    Neuropathy      No past surgical history on file. No family history on file. Social History     Tobacco Use    Smoking status: Never Smoker   Substance Use Topics    Alcohol use: Not on file    Drug use: Not on file       REVIEW OF SYSTEMS:   A ten system review of constitutional, cardiovascular, respiratory, musculoskeletal, endocrine, skin, SHEENT, genitourinary, psychiatric and neurologic systems was obtained and is unremarkable with the exception of leg swelling, nausea and vomiting and shortness of breath    EXAMINATION:   Visit Vitals  /74 (BP 1 Location: Left upper arm, BP Patient Position: Sitting)   Pulse 65   Temp 97.7 °F (36.5 °C) (Temporal)   Resp 16   Ht 5' 5\" (1.651 m)   Wt 239 lb (108.4 kg)   SpO2 99%   BMI 39.77 kg/m²        General:   General appearance: Pt is in no acute distress   Distal pulses are preserved  Fundoscopic Exam: Normal    Neurological Examination:   Mental Status: AAO x3. Speech is fluent. Follows commands, has normal fund of knowledge, attention, short term recall, comprehension and insight. Cranial Nerves: Visual fields are full. PERRL, Extraocular movements are full. Facial sensation intact. Facial movement intact. Hearing intact to conversation. Palate elevates symmetrically. Shoulder shrug symmetric. Tongue midline. Motor: Strength is 5/5 in all 4 ext. No atrophy. Tone: Normal    Sensation: Light touch - Normal    Reflexes: DTRs 2+ throughout. Coordination/Cerebellar: Intact to finger-nose-finger     Gait: Casual gait is normal.     Skin: No significant bruising or lacerations.     Laboratory review:   Results for orders placed or performed during the hospital encounter of 05/14/19   CBC WITH AUTOMATED DIFF   Result Value Ref Range    WBC 7.0 3.6 - 11.0 K/uL    RBC 4.33 3.80 - 5.20 M/uL    HGB 11.7 11.5 - 16.0 g/dL    HCT 35.9 35.0 - 47.0 %    MCV 82.9 80.0 - 99.0 FL    MCH 27.0 26.0 - 34.0 PG    MCHC 32.6 30.0 - 36.5 g/dL    RDW 12.9 11.5 - 14.5 % PLATELET 410 833 - 659 K/uL    MPV 10.4 8.9 - 12.9 FL    NRBC 0.0 0  WBC    ABSOLUTE NRBC 0.00 0.00 - 0.01 K/uL    NEUTROPHILS 52 32 - 75 %    LYMPHOCYTES 35 12 - 49 %    MONOCYTES 9 5 - 13 %    EOSINOPHILS 3 0 - 7 %    BASOPHILS 1 0 - 1 %    IMMATURE GRANULOCYTES 0 0.0 - 0.5 %    ABS. NEUTROPHILS 3.6 1.8 - 8.0 K/UL    ABS. LYMPHOCYTES 2.5 0.8 - 3.5 K/UL    ABS. MONOCYTES 0.6 0.0 - 1.0 K/UL    ABS. EOSINOPHILS 0.2 0.0 - 0.4 K/UL    ABS. BASOPHILS 0.1 0.0 - 0.1 K/UL    ABS. IMM. GRANS. 0.0 0.00 - 0.04 K/UL    DF AUTOMATED     METABOLIC PANEL, BASIC   Result Value Ref Range    Sodium 138 136 - 145 mmol/L    Potassium 4.0 3.5 - 5.1 mmol/L    Chloride 105 97 - 108 mmol/L    CO2 30 21 - 32 mmol/L    Anion gap 3 (L) 5 - 15 mmol/L    Glucose 93 65 - 100 mg/dL    BUN 8 6 - 20 MG/DL    Creatinine 0.73 0.55 - 1.02 MG/DL    BUN/Creatinine ratio 11 (L) 12 - 20      GFR est AA >60 >60 ml/min/1.73m2    GFR est non-AA >60 >60 ml/min/1.73m2    Calcium 8.4 (L) 8.5 - 10.1 MG/DL   URINALYSIS W/ REFLEX CULTURE    Specimen: Urine   Result Value Ref Range    Color YELLOW/STRAW      Appearance CLEAR CLEAR      Specific gravity 1.022 1.003 - 1.030      pH (UA) 7.5 5.0 - 8.0      Protein NEGATIVE  NEG mg/dL    Glucose NEGATIVE  NEG mg/dL    Ketone NEGATIVE  NEG mg/dL    Bilirubin NEGATIVE  NEG      Blood NEGATIVE  NEG      Urobilinogen 1.0 0.2 - 1.0 EU/dL    Nitrites NEGATIVE  NEG      Leukocyte Esterase NEGATIVE  NEG      WBC 0-4 0 - 4 /hpf    RBC 0-5 0 - 5 /hpf    Epithelial cells FEW FEW /lpf    Bacteria NEGATIVE  NEG /hpf    UA:UC IF INDICATED CULTURE NOT INDICATED BY UA RESULT CNI      Hyaline cast 0-2 0 - 5 /lpf   HCG URINE, QL. - POC   Result Value Ref Range    Pregnancy test,urine (POC) NEGATIVE  NEG         Imaging review:  MRI brain  No acute intracranial process.     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 No skull base mass lesion.   No abnormal cranial nerve enhancement or displacement. No intracranial mass, hemorrhage or evidence of acute infarction. Documentation review:  None    Assessment/Plan:   Fidel Fonseca is a 47 y.o. female who presented with right facial pain that started after she did have right upper molar pulled out. This happened in April 2019 but the pain has been gradually getting worse. Based on the description she does have right trigeminal neuralgia. She is taking 600 mg p.o. twice daily (5 mg p.o. daily. She is having increasing pain recently. I plan to increase the baclofen to 10 mg p.o. 3 times daily for 1 week and then 10 mg p.o. 3 times daily. I am going to refer her to neurosurgery Dr. Jatin Grey. Follow-up in 3 months    No flowsheet data found. Primary care to address possible depression if PHQ-9 score is more than 9. ICD-10-CM ICD-9-CM    1. Trigeminal neuralgia of right side of face  G50.0 350.1       Thank you for allowing me to participate in the care of Ms. Oakley. Please feel free to contact me if you have any questions. Electronically signed. Dano Andersen MD  Neurologist    CC: Lilliana Maldonado MD  Fax: [phone]    This note was created using voice recognition software. Despite editing, there may be syntax errors.

## 2021-05-05 ENCOUNTER — OFFICE VISIT (OUTPATIENT)
Dept: NEUROLOGY | Age: 41
End: 2021-05-05
Payer: COMMERCIAL

## 2021-05-05 VITALS
HEART RATE: 74 BPM | DIASTOLIC BLOOD PRESSURE: 88 MMHG | BODY MASS INDEX: 39.74 KG/M2 | SYSTOLIC BLOOD PRESSURE: 130 MMHG | OXYGEN SATURATION: 98 % | WEIGHT: 238.8 LBS | RESPIRATION RATE: 16 BRPM

## 2021-05-05 DIAGNOSIS — M54.41 ACUTE BILATERAL LOW BACK PAIN WITH BILATERAL SCIATICA: ICD-10-CM

## 2021-05-05 DIAGNOSIS — M54.42 ACUTE BILATERAL LOW BACK PAIN WITH BILATERAL SCIATICA: ICD-10-CM

## 2021-05-05 DIAGNOSIS — G50.0 TRIGEMINAL NEURALGIA OF RIGHT SIDE OF FACE: Primary | ICD-10-CM

## 2021-05-05 PROCEDURE — 99214 OFFICE O/P EST MOD 30 MIN: CPT | Performed by: PSYCHIATRY & NEUROLOGY

## 2021-05-05 RX ORDER — GABAPENTIN 300 MG/1
300 CAPSULE ORAL 2 TIMES DAILY
Qty: 60 CAP | Refills: 2 | Status: SHIPPED | OUTPATIENT
Start: 2021-05-05 | End: 2021-09-20 | Stop reason: SDUPTHER

## 2021-05-05 RX ORDER — MOMETASONE FUROATE 1 MG/G
OINTMENT TOPICAL DAILY
COMMUNITY
Start: 2020-12-02 | End: 2021-12-02

## 2021-05-05 RX ORDER — KETOTIFEN FUMARATE 0.35 MG/ML
SOLUTION/ DROPS OPHTHALMIC
COMMUNITY
Start: 2020-10-21

## 2021-05-05 NOTE — PROGRESS NOTES
Chief Complaint   Patient presents with    Follow-up     trigeminal neuralgia - had surgery 2021, stated the pain went away for a month but came back the last sunday in march real light and is no stronger and often

## 2021-05-05 NOTE — PROGRESS NOTES
Neurology Note    Chief Complaint   Patient presents with    Follow-up     trigeminal neuralgia - had surgery 2021, stated the pain went away for a month but came back the last sunday in march real light and is no stronger and often        HPI/Subjective  Nathan Cano is a 46 y.o. female who presented with right facial pain. She had her tooth pulled out in April 2019 and she was intermittent pain in the right upper jaw. It gradually worsened and now she is having pain in both the upper and lower jaw. It is a shock like sensation. It is 10/10 in severity. It radiates to the jaw and the eye. She does have problem with chewing and brushing. Her dentist did Xray and did not find a problem with teeth. Interval history:  MRI brain is normal  She is having increasing pain over the last 2 months. Patient did have gamma knife surgery for trigeminal neuralgia and that helped her for around 1 month and now she has started having the pain again. Current Outpatient Medications   Medication Sig    mometasone (ELOCON) 0.1 % ointment Apply  to affected area daily.  ketotifen (ZADITOR) 0.025 % (0.035 %) ophthalmic solution INSTILL 1 DROP INTO LEFT EYE 2 TIMES A DAY    baclofen (LIORESAL) 10 mg tablet Take 1 Tab by mouth three (3) times daily.  OXcarbazepine (TRILEPTAL) 600 mg tablet Take 1 Tab by mouth two (2) times a day.  fluticasone propionate (Flonase Allergy Relief) 50 mcg/actuation nasal spray 2 Sprays by Both Nostrils route daily.  Cetirizine (ZyrTEC) 10 mg cap Take  by mouth.  prednisoLONE acetate (PRED FORTE) 1 % ophthalmic suspension Administer 1 Drop to both eyes four (4) times daily.  montelukast (SINGULAIR) 10 mg tablet Take 10 mg by mouth daily.  ascorbic acid, vitamin C, (Vitamin C) 250 mg tablet Take  by mouth.  cyanocobalamin 1,000 mcg tablet Take 1,000 mcg by mouth daily.  cholecalciferol (VITAMIN D3) (5000 Units/125 mcg) tab tablet Take 5,000 mcg by mouth daily.  Taking once a week     No current facility-administered medications for this visit. No Known Allergies  Past Medical History:   Diagnosis Date    Neuropathy     Trigeminal neuralgia      Past Surgical History:   Procedure Laterality Date    HX OTHER SURGICAL  2021    gamma knife     History reviewed. No pertinent family history. Social History     Tobacco Use    Smoking status: Never Smoker    Smokeless tobacco: Never Used   Substance Use Topics    Alcohol use: Not on file    Drug use: Not on file       REVIEW OF SYSTEMS:   A ten system review of constitutional, cardiovascular, respiratory, musculoskeletal, endocrine, skin, SHEENT, genitourinary, psychiatric and neurologic systems was obtained and is unremarkable with the exception of leg swelling, nausea and vomiting and shortness of breath    EXAMINATION:   Visit Vitals  /88 (BP 1 Location: Left arm, BP Patient Position: Sitting, BP Cuff Size: Adult)   Pulse 74   Resp 16   Wt 238 lb 12.8 oz (108.3 kg)   SpO2 98%   BMI 39.74 kg/m²        General:   General appearance: Pt is in no acute distress   Distal pulses are preserved    Neurological Examination:   Mental Status: AAO x3. Speech is fluent. Follows commands, has normal fund of knowledge, attention, short term recall, comprehension and insight. Cranial Nerves: Visual fields are full. PERRL, Extraocular movements are full. Facial sensation intact. Facial movement intact. Hearing intact to conversation. Palate elevates symmetrically. Shoulder shrug symmetric. Tongue midline. Motor: Strength is 5/5 in all 4 ext. No atrophy. Tone: Normal    Sensation: Light touch - Normal    Reflexes: DTRs 2+ throughout. Coordination/Cerebellar: Intact to finger-nose-finger     Gait: Casual gait is normal.     Skin: No significant bruising or lacerations.     Laboratory review:   Results for orders placed or performed during the hospital encounter of 05/14/19   CBC WITH AUTOMATED DIFF   Result Value Ref Range    WBC 7.0 3.6 - 11.0 K/uL    RBC 4.33 3.80 - 5.20 M/uL    HGB 11.7 11.5 - 16.0 g/dL    HCT 35.9 35.0 - 47.0 %    MCV 82.9 80.0 - 99.0 FL    MCH 27.0 26.0 - 34.0 PG    MCHC 32.6 30.0 - 36.5 g/dL    RDW 12.9 11.5 - 14.5 %    PLATELET 215 714 - 002 K/uL    MPV 10.4 8.9 - 12.9 FL    NRBC 0.0 0  WBC    ABSOLUTE NRBC 0.00 0.00 - 0.01 K/uL    NEUTROPHILS 52 32 - 75 %    LYMPHOCYTES 35 12 - 49 %    MONOCYTES 9 5 - 13 %    EOSINOPHILS 3 0 - 7 %    BASOPHILS 1 0 - 1 %    IMMATURE GRANULOCYTES 0 0.0 - 0.5 %    ABS. NEUTROPHILS 3.6 1.8 - 8.0 K/UL    ABS. LYMPHOCYTES 2.5 0.8 - 3.5 K/UL    ABS. MONOCYTES 0.6 0.0 - 1.0 K/UL    ABS. EOSINOPHILS 0.2 0.0 - 0.4 K/UL    ABS. BASOPHILS 0.1 0.0 - 0.1 K/UL    ABS. IMM.  GRANS. 0.0 0.00 - 0.04 K/UL    DF AUTOMATED     METABOLIC PANEL, BASIC   Result Value Ref Range    Sodium 138 136 - 145 mmol/L    Potassium 4.0 3.5 - 5.1 mmol/L    Chloride 105 97 - 108 mmol/L    CO2 30 21 - 32 mmol/L    Anion gap 3 (L) 5 - 15 mmol/L    Glucose 93 65 - 100 mg/dL    BUN 8 6 - 20 MG/DL    Creatinine 0.73 0.55 - 1.02 MG/DL    BUN/Creatinine ratio 11 (L) 12 - 20      GFR est AA >60 >60 ml/min/1.73m2    GFR est non-AA >60 >60 ml/min/1.73m2    Calcium 8.4 (L) 8.5 - 10.1 MG/DL   URINALYSIS W/ REFLEX CULTURE    Specimen: Urine   Result Value Ref Range    Color YELLOW/STRAW      Appearance CLEAR CLEAR      Specific gravity 1.022 1.003 - 1.030      pH (UA) 7.5 5.0 - 8.0      Protein NEGATIVE  NEG mg/dL    Glucose NEGATIVE  NEG mg/dL    Ketone NEGATIVE  NEG mg/dL    Bilirubin NEGATIVE  NEG      Blood NEGATIVE  NEG      Urobilinogen 1.0 0.2 - 1.0 EU/dL    Nitrites NEGATIVE  NEG      Leukocyte Esterase NEGATIVE  NEG      WBC 0-4 0 - 4 /hpf    RBC 0-5 0 - 5 /hpf    Epithelial cells FEW FEW /lpf    Bacteria NEGATIVE  NEG /hpf    UA:UC IF INDICATED CULTURE NOT INDICATED BY UA RESULT CNI      Hyaline cast 0-2 0 - 5 /lpf   HCG URINE, QL. - POC   Result Value Ref Range    Pregnancy test,urine (POC) NEGATIVE  NEG Imaging review:  MRI brain  No acute intracranial process. 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 No skull base mass lesion. No abnormal cranial nerve enhancement or displacement. No intracranial mass, hemorrhage or evidence of acute infarction. Documentation review:  None    Assessment/Plan:   Gregory Gaming is a 46 y.o. female who presented with right facial pain that started after she did have right upper molar pulled out. This happened in April 2019 but the pain has been gradually getting worse. Based on the description she does have right trigeminal neuralgia. Patient is status post gamma knife surgery. Patient's pain is coming back. Continue Trileptal 600 mg p.o. twice daily, baclofen 10 mg p.o. twice daily and will be starting the patient on gabapentin 300 mg p.o. twice daily as well. She is going to see Dr. Manish Singh on Tuesday and have asked her to discuss it with him as well. Follow-up in 2 months    3 most recent PHQ Screens 5/5/2021   Little interest or pleasure in doing things Not at all   Feeling down, depressed, irritable, or hopeless Not at all   Total Score PHQ 2 0     Primary care to address possible depression if PHQ-9 score is more than 9. ICD-10-CM ICD-9-CM    1. Trigeminal neuralgia of right side of face  G50.0 350.1    2. Acute bilateral low back pain with bilateral sciatica  M54.42 724.2     M54.41 724.3       Thank you for allowing me to participate in the care of Ms. Oakley. Please feel free to contact me if you have any questions. Electronically signed. Carol Shipley MD  Neurologist    CC: Linh Gardner MD  Fax: [phone]    This note was created using voice recognition software. Despite editing, there may be syntax errors.

## 2021-08-18 DIAGNOSIS — G50.0 TRIGEMINAL NEURALGIA OF RIGHT SIDE OF FACE: ICD-10-CM

## 2021-08-18 RX ORDER — BACLOFEN 10 MG/1
10 TABLET ORAL 3 TIMES DAILY
Qty: 270 TABLET | Refills: 1 | Status: SHIPPED | OUTPATIENT
Start: 2021-08-18

## 2021-09-20 DIAGNOSIS — G50.0 TRIGEMINAL NEURALGIA OF RIGHT SIDE OF FACE: ICD-10-CM

## 2021-09-20 RX ORDER — GABAPENTIN 300 MG/1
300 CAPSULE ORAL 2 TIMES DAILY
Qty: 60 CAPSULE | Refills: 2 | Status: SHIPPED | OUTPATIENT
Start: 2021-09-20

## 2021-11-02 DIAGNOSIS — G50.0 TRIGEMINAL NEURALGIA OF RIGHT SIDE OF FACE: ICD-10-CM

## 2021-11-02 RX ORDER — OXCARBAZEPINE 600 MG/1
600 TABLET, FILM COATED ORAL 2 TIMES DAILY
Qty: 180 TABLET | Refills: 3 | Status: SHIPPED | OUTPATIENT
Start: 2021-11-02

## 2021-11-19 ENCOUNTER — TELEPHONE (OUTPATIENT)
Dept: NEUROLOGY | Age: 41
End: 2021-11-19

## 2021-11-19 NOTE — TELEPHONE ENCOUNTER
----- Message from Zachary Vital sent at 11/19/2021 10:43 AM EST -----  Regarding: /telephone  General Message/Vendor Calls    Caller's first and last name:self      Reason for call:pt's conditions       Callback required yes/no and why: yes to speak with TOSIN Holm       Best contact number(s):[phone]      Details to clarify the request:Pt called to speak with TOSIN Holm, regarding on her conditions. Pt is stating that she is shaking in her sleep and wasn't able to wake up for a while. Pt is concerns would like to speak with TOSIN Holm.       Zachary Vital

## 2021-11-22 NOTE — TELEPHONE ENCOUNTER
Attempted to contact patient on number listed in this encounter.   No answer and unable to leave a message as the mailbox is full

## 2022-03-10 ENCOUNTER — OFFICE VISIT (OUTPATIENT)
Dept: NEUROLOGY | Age: 42
End: 2022-03-10
Payer: COMMERCIAL

## 2022-03-10 VITALS
DIASTOLIC BLOOD PRESSURE: 86 MMHG | RESPIRATION RATE: 16 BRPM | HEART RATE: 72 BPM | WEIGHT: 240 LBS | TEMPERATURE: 97.6 F | BODY MASS INDEX: 39.99 KG/M2 | HEIGHT: 65 IN | OXYGEN SATURATION: 100 % | SYSTOLIC BLOOD PRESSURE: 138 MMHG

## 2022-03-10 DIAGNOSIS — R25.1 EPISODE OF SHAKING: ICD-10-CM

## 2022-03-10 DIAGNOSIS — G50.0 TRIGEMINAL NEURALGIA OF RIGHT SIDE OF FACE: Primary | ICD-10-CM

## 2022-03-10 PROBLEM — N64.4 PAIN OF BOTH BREASTS: Status: ACTIVE | Noted: 2021-10-13

## 2022-03-10 PROBLEM — K43.9 HERNIA OF ANTERIOR ABDOMINAL WALL: Status: ACTIVE | Noted: 2017-09-04

## 2022-03-10 PROBLEM — L30.9 ECZEMA: Status: ACTIVE | Noted: 2017-09-04

## 2022-03-10 PROBLEM — D21.9 LEIOMYOMA: Status: ACTIVE | Noted: 2019-08-07

## 2022-03-10 PROBLEM — M79.605 PAIN IN BOTH LOWER EXTREMITIES: Status: ACTIVE | Noted: 2019-03-06

## 2022-03-10 PROBLEM — E66.9 OBESITY: Status: ACTIVE | Noted: 2021-10-13

## 2022-03-10 PROBLEM — M54.50 SPINE PAIN, LUMBAR: Status: ACTIVE | Noted: 2019-03-06

## 2022-03-10 PROBLEM — D64.9 ANEMIA: Status: ACTIVE | Noted: 2017-09-04

## 2022-03-10 PROBLEM — M79.604 PAIN IN BOTH LOWER EXTREMITIES: Status: ACTIVE | Noted: 2019-03-06

## 2022-03-10 PROCEDURE — 99214 OFFICE O/P EST MOD 30 MIN: CPT | Performed by: PSYCHIATRY & NEUROLOGY

## 2022-03-10 NOTE — PROGRESS NOTES
Chief Complaint   Patient presents with    Neurologic Problem     Former Pt of Dr. Gregary Moritz, right side facial pain. 1. Have you been to the ER, urgent care clinic since your last visit? Hospitalized since your last visit? No    2. Have you seen or consulted any other health care providers outside of the [address] [place] [address] since your last visit? Include any pap smears or colon screening. Gamma knife surgery  Dr. Shagufta Hartman at [address] [place] [address] [phone], Trigeminal Neuralgia surgery. Pt states she is better, now she is having pain in right eye.    Pt has been to Dr. Villasenor Goldsmith [phone]

## 2022-03-10 NOTE — PROGRESS NOTES
[facility] [address]  In Office FOLLOW-UP VISIT         Steff Bloom is a 38 y.o. female who presents today for the following:  Chief Complaint   Patient presents with    Neurologic Problem     Former Pt of Dr. Yesenia Bar, right side facial pain. ASSESSMENT AND PLAN  Patient is known to this practice. This is my first time seeing the patient. Chart and history reviewed in detail at today's office visit. 1. Trigeminal neuralgia of right side of face  Assessment & Plan: For right now the patient is under the care of Dr. Yue Aguirre. I do not want to get in the middle of his treatment protocols    The only thing that I would add is to use Voltaren gel 3-4 times a day in her eye region and jaw abrasion where she is having some pain      2. Episode of shaking  Assessment & Plan:   Noted at night by her daughter    I have asked her to get this on video or have her daughter video it next time she notices it so we can get a better evaluation of what is exactly going on    It does not necessarily sound like seizures since patient feels completely normal at night and there is no stated symptoms such as bedwetting or tongue biting    We did talk about EEG and doing additional work-up but will defer that presently      Patient and/or family was given time to ask questions and voice concerns. I believe all questions concerns were adequately addressed at this  office visit. Patient and/or family also verbalized agreement and understanding of the above-stated plan    Patient remains a complex patient secondary to polypharmacy, significant comorbid conditions, and use of high-risk medications which complicate the decision making process related to patient's neurologic diagnosis          ICD-10-CM ICD-9-CM    1.  Trigeminal neuralgia of right side of face  G50.0 350.1    2. Episode of shaking  R25.1 781.0          I attest that 30 minutes was spent on today's visit reviewing medical records and diagnostic testing deemed pertinent to this patient's care, along with direct time spent at patient's visit including the history, physical assessment and plan, discussing diagnosis and management along with documentation. HPI  Historical Data  Patient is known to the practice and was previously seen by Dr. Tiny Constantino    Neurologic diagnosis  Trigeminal neuralgia   Failed gamma knife surgery in 2021    Within a month with symptoms return   Onset of trigeminal neuralgia occurred April 2019 after a pulled tooth in the right upper jaw    Other significant comorbid conditions/concerns  Obesity  Eczema  History of venous stasis dermatitis      Interim Data:     Trigeminal neuralgia: right   Glyceral rhyzolysis completed by Dr. Leonardo Cain pain improved but now noticing discomfort above her eye and down along her jawline   States he is aware  They are working on weaning off her medication presently she is on Neurontin 300 mg daily along with Trileptal 600 mg once daily    States that her daughter had noticed that she shakes at night when she sleeps and she did not do that previously to the injection   She feels completely normal in the morning and does not report any associated such as myalgias tongue biting feeling just unusual or worn out or any type of incontinence    Patient also gets headaches because of the trigeminal neuralgia does not really take anything because she does not want to take any medications    Since injection she also has not in the Z2 zone and sometimes she drools and does not notice it because she cannot feel it    She is very frustrated understandably                     Pertinent diagnostic data      Results from Hospital Encounter encounter on 10/28/20    MRI BRAIN W WO CONT    Impression  IMPRESSION:  No acute intracranial process. 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 No skull base mass lesion.   No abnormal cranial nerve enhancement or displacement. No intracranial mass, hemorrhage or evidence of acute infarction. No Known Allergies    Current Outpatient Medications   Medication Sig    OXcarbazepine (TRILEPTAL) 600 mg tablet Take 1 Tablet by mouth two (2) times a day. (Patient taking differently: Take 600 mg by mouth daily.)    gabapentin (NEURONTIN) 300 mg capsule Take 1 Capsule by mouth two (2) times a day. Max Daily Amount: 600 mg. (Patient taking differently: Take 300 mg by mouth daily.)    baclofen (LIORESAL) 10 mg tablet Take 1 Tablet by mouth three (3) times daily.  fluticasone propionate (Flonase Allergy Relief) 50 mcg/actuation nasal spray 2 Sprays by Both Nostrils route daily.  Cetirizine (ZyrTEC) 10 mg cap Take  by mouth.  montelukast (SINGULAIR) 10 mg tablet Take 10 mg by mouth daily.  ascorbic acid, vitamin C, (Vitamin C) 250 mg tablet Take  by mouth.  cyanocobalamin 1,000 mcg tablet Take 1,000 mcg by mouth daily.  cholecalciferol (VITAMIN D3) (5000 Units/125 mcg) tab tablet Take 5,000 mcg by mouth daily. Taking once a week    ketotifen (ZADITOR) 0.025 % (0.035 %) ophthalmic solution INSTILL 1 DROP INTO LEFT EYE 2 TIMES A DAY (Patient not taking: Reported on 3/10/2022)    prednisoLONE acetate (PRED FORTE) 1 % ophthalmic suspension Administer 1 Drop to both eyes four (4) times daily. (Patient not taking: Reported on 3/10/2022)     No current facility-administered medications for this visit.        Past medical history/surgical history, family history, and social history have been reviewed for today's visit      ROS    A ten system review of constitutional, cardiovascular, respiratory, musculoskeletal, endocrine, skin, SHEENT, genitourinary, psychiatric and neurologic systems was obtained and is unremarkable except as mentioned under HPI          EXAMINATION:     Visit Vitals  /86 (BP 1 Location: Left arm, BP Patient Position: Sitting, BP Cuff Size: Small adult) Pulse 72   Temp 97.6 °F (36.4 °C) (Temporal)   Resp 16   Ht 5' 5\" (1.651 m)   Wt 240 lb (108.9 kg)   LMP 03/10/2022 (Exact Date)   SpO2 100%   BMI 39.94 kg/m²         General appearance: Patient is well-developed and well-nourished in no apparent distress and well groomed. Psych/mental health:  Affect: Appropriate    PHQ  3 most recent PHQ Screens 3/10/2022   Little interest or pleasure in doing things Not at all   Feeling down, depressed, irritable, or hopeless Not at all   Total Score PHQ 2 0       HEENT:   Normocephalic  With evidence of trauma: No  Full range of motion head neck: Yes  Tenderness to palpation of the head neck region: No      Cardiovascular:     Extremities warm to touch: Yes  Extremity swelling: No  Discoloration: No  Evidence of PVD: No    Respiratory:   Dyspnea on exertion: No   Abnormal effort on casual observation: No   Use of portable oxygen: No   Evidence of cyanosis: No     Musculoskeletal:   Evidence of significant bone deformities: No   Spinal curvature: No     Integumentary:    Obvious bruising: No   Lacerations or discoloration on casual observation: No       Neurological Examination:   Mental Status:        MMSE  No flowsheet data found. Formal testing was not completed    there was nothing concerning on general observation and discussion.    Alert oriented and appropriate to general conversation  Normal processing on general observation  Followed conversation and responded seemingly appropriate throughout the office visit  No word finding difficulties noted on casual observation  Able to follow directions without difficulty     Cranial Nerves:    EOMs intact gaze is conjugate  No nystagmus is appreciated  Facial motor intact bilaterally  Hearing intact to conversation  Voice with normal projection, no evidence of secretion pooling  Shoulder shrug intact bilaterally  No tongue deviation appreciated     Motor:   Normal bulk  No tremor appreciated on today's exam  No abnormal movements appreciated on today's exam  Moves extremities spontaneously and with purpose  5/5 x 4      Sensation: Intact to light touch    Coordination/Cerebellar:   Grossly intact    Gait: Ambulates independently    Reflexes: Symmetrical    Fall risk assessment  No flowsheet data found. Follow-up and Dispositions    · Return if symptoms worsen or fail to improve.              Sumit Peralta MS, ANP-BC, [facility]

## 2022-03-10 NOTE — ASSESSMENT & PLAN NOTE
Noted at night by her daughter    I have asked her to get this on video or have her daughter video it next time she notices it so we can get a better evaluation of what is exactly going on    It does not necessarily sound like seizures since patient feels completely normal at night and there is no stated symptoms such as bedwetting or tongue biting    We did talk about EEG and doing additional work-up but will defer that presently

## 2022-03-10 NOTE — LETTER
3/10/2022    Patient: Lalit Gonzalez   YOB: 1980   Date of Visit: 3/10/2022     Tish Scott MD  [address] [facility] [address]  Via Fax: [phone]    Dear Tish Scott MD,      Thank you for referring Ms. Nimco Lora to [address] [place] [address] for evaluation. My notes for this consultation are attached. If you have questions, please do not hesitate to call me. I look forward to following your patient along with you.       Sincerely,    Jenelle Wheatley NP

## 2022-03-10 NOTE — PATIENT INSTRUCTIONS
As per discussion    Regarding these abnormal movements are shaking but you do when you sleep please ask your daughter if she is noticing it to get it on video so we can get a better determination as to what that is    If she gets along very well before you see Dr. Shagufta Hartman back make sure that you show that to him for further evaluation    In the meantime continue on the medications as prescribed by Dr. Shagufta Hartman. The only thing I would recommend is may be getting the Voltaren gel over-the-counter topical and applying that to the area above your eye and that your jaw and you can do that up to 4 times a day to try to help reduce the irritation and inflammation on that nerve    For here for you should you need us just give us a call  And as we discussed you want a follow-up as needed so we will not set up a follow-up appointment at this time        Office Policies    o Phone calls/patient messages:  Please allow up to 24 hours for someone in the office to contact you about your call or message. Be mindful your provider may be out of the office or your message may require further review. We encourage you to use [employer] for your messages as this is a faster, more efficient way to communicate with our office    o Medication Refills:  Prescription medications require up to 48 business hours to process. We encourage you to use [employer] for your refills. For controlled medications: Please allow up to 72 business hours to process. Certain medications may require you to  a written prescription at our office. NO narcotic/controlled medications will be prescribed after 4pm Monday through Friday or on weekends    o Form/Paperwork Completion:  We ask that you allow 7-14 business days. You may also download your forms to [employer] to have your doctor print off.

## 2022-03-10 NOTE — ASSESSMENT & PLAN NOTE
For right now the patient is under the care of Dr. Rangel Patton.   I do not want to get in the middle of his treatment protocols    The only thing that I would add is to use Voltaren gel 3-4 times a day in her eye region and jaw abrasion where she is having some pain

## 2022-03-18 PROBLEM — M54.50 SPINE PAIN, LUMBAR: Status: ACTIVE | Noted: 2019-03-06

## 2022-03-19 PROBLEM — M79.605 PAIN IN BOTH LOWER EXTREMITIES: Status: ACTIVE | Noted: 2019-03-06

## 2022-03-19 PROBLEM — L30.9 ECZEMA: Status: ACTIVE | Noted: 2017-09-04

## 2022-03-19 PROBLEM — N64.4 PAIN OF BOTH BREASTS: Status: ACTIVE | Noted: 2021-10-13

## 2022-03-19 PROBLEM — K43.9 HERNIA OF ANTERIOR ABDOMINAL WALL: Status: ACTIVE | Noted: 2017-09-04

## 2022-03-19 PROBLEM — G50.0 TRIGEMINAL NEURALGIA OF RIGHT SIDE OF FACE: Status: ACTIVE | Noted: 2022-03-10

## 2022-03-19 PROBLEM — D64.9 ANEMIA: Status: ACTIVE | Noted: 2017-09-04

## 2022-03-19 PROBLEM — M79.604 PAIN IN BOTH LOWER EXTREMITIES: Status: ACTIVE | Noted: 2019-03-06

## 2022-03-19 PROBLEM — D21.9 LEIOMYOMA: Status: ACTIVE | Noted: 2019-08-07

## 2022-03-20 PROBLEM — E66.9 OBESITY: Status: ACTIVE | Noted: 2021-10-13

## 2022-03-20 PROBLEM — R25.1 EPISODE OF SHAKING: Status: ACTIVE | Noted: 2022-03-10

## 2022-05-09 DIAGNOSIS — G50.0 TRIGEMINAL NEURALGIA OF RIGHT SIDE OF FACE: ICD-10-CM

## 2022-05-10 RX ORDER — BACLOFEN 10 MG/1
10 TABLET ORAL 3 TIMES DAILY
Qty: 270 TABLET | Refills: 1 | OUTPATIENT
Start: 2022-05-10

## 2022-11-06 DIAGNOSIS — G50.0 TRIGEMINAL NEURALGIA OF RIGHT SIDE OF FACE: ICD-10-CM

## 2022-11-08 RX ORDER — OXCARBAZEPINE 600 MG/1
TABLET, FILM COATED ORAL
Qty: 180 TABLET | Refills: 3 | Status: SHIPPED | OUTPATIENT
Start: 2022-11-08

## 2023-01-17 ENCOUNTER — TELEPHONE (OUTPATIENT)
Dept: NEUROLOGY | Age: 43
End: 2023-01-17

## 2023-01-17 DIAGNOSIS — G50.0 TRIGEMINAL NEURALGIA OF RIGHT SIDE OF FACE: ICD-10-CM

## 2023-01-17 NOTE — TELEPHONE ENCOUNTER
Pt has had face surgery but neurosurgeon will no longer fill her medication. Pt was told to call her neurologist to get back in to  scripts. Next avail appt is not until July. Pt did not want to schedule out that far. Would like to know if she can be seen sooner so she can get her scripts.  Please call [phone]

## 2023-01-18 NOTE — TELEPHONE ENCOUNTER
LVM for patient that we need to know what the medications she need are, and she needs the appointment on the books and Evan Belt may do the scripts depending on what they are if she has an upcoming appointment. Patient last seen 3/10/2022 and she can be also placed on the cancellation list to be seen sooner but has to have an appointment scheduled.

## 2023-01-26 RX ORDER — OXCARBAZEPINE 600 MG/1
600 TABLET, FILM COATED ORAL 2 TIMES DAILY
Qty: 180 TABLET | Refills: 3 | Status: CANCELLED | OUTPATIENT
Start: 2023-01-26

## 2023-01-26 NOTE — TELEPHONE ENCOUNTER
Patient called back to schedule an appt for next available (7/31). Pt stated she needs these medications refilled. Baclofen 10 mg tablet    Gabapentin 300 mg    Oxcarbazepine 600 mg    Requested Prescriptions     Pending Prescriptions Disp Refills    baclofen (LIORESAL) 10 mg tablet 270 Tablet 1     Sig: Take 1 Tablet by mouth three (3) times daily. gabapentin (NEURONTIN) 300 mg capsule 60 Capsule 2     Sig: Take 1 Capsule by mouth two (2) times a day. Max Daily Amount: 600 mg. OXcarbazepine (TRILEPTAL) 600 mg tablet 180 Tablet 3     Sig: Take 1 Tablet by mouth two (2) times a day.        Pharmacy Name:  [facility] [place] [address] in [address] [place] [address] [address] #:   [phone]

## 2023-01-27 DIAGNOSIS — G50.0 TRIGEMINAL NEURALGIA OF RIGHT SIDE OF FACE: ICD-10-CM

## 2023-01-27 RX ORDER — OXCARBAZEPINE 600 MG/1
600 TABLET, FILM COATED ORAL 2 TIMES DAILY
Qty: 180 TABLET | Refills: 3 | Status: SHIPPED | OUTPATIENT
Start: 2023-01-27

## 2023-01-27 RX ORDER — BACLOFEN 10 MG/1
10 TABLET ORAL 3 TIMES DAILY
Qty: 270 TABLET | Refills: 1 | Status: SHIPPED | OUTPATIENT
Start: 2023-01-27

## 2023-01-27 RX ORDER — GABAPENTIN 300 MG/1
300 CAPSULE ORAL 2 TIMES DAILY
Qty: 60 CAPSULE | Refills: 2 | Status: SHIPPED | OUTPATIENT
Start: 2023-01-27

## 2023-01-27 NOTE — TELEPHONE ENCOUNTER
Appointment was made by Ronald Tyson and needs refills.      Last office visit 3/10/2022  Last med refill   Oxcarbazepine 180 with 3 refills not needed  Gabapentin 9/20/2022 60 with 2 refills monthly  Baclofen 8/1/2022

## 2023-06-06 ENCOUNTER — HOSPITAL ENCOUNTER (EMERGENCY)
Facility: HOSPITAL | Age: 43
Discharge: HOME | End: 2023-06-06
Attending: EMERGENCY MEDICINE
Payer: COMMERCIAL

## 2023-06-06 ENCOUNTER — APPOINTMENT (OUTPATIENT)
Facility: HOSPITAL | Age: 43
End: 2023-06-06
Payer: COMMERCIAL

## 2023-06-06 VITALS
WEIGHT: 222 LBS | RESPIRATION RATE: 18 BRPM | HEART RATE: 80 BPM | HEIGHT: 65 IN | DIASTOLIC BLOOD PRESSURE: 99 MMHG | SYSTOLIC BLOOD PRESSURE: 153 MMHG | OXYGEN SATURATION: 97 % | BODY MASS INDEX: 36.99 KG/M2 | TEMPERATURE: 98.2 F

## 2023-06-06 DIAGNOSIS — N93.9 ABNORMAL VAGINAL BLEEDING: ICD-10-CM

## 2023-06-06 DIAGNOSIS — R10.30 LOWER ABDOMINAL PAIN: Primary | ICD-10-CM

## 2023-06-06 DIAGNOSIS — D25.9 UTERINE LEIOMYOMA, UNSPECIFIED LOCATION: ICD-10-CM

## 2023-06-06 DIAGNOSIS — D50.0 BLOOD LOSS ANEMIA: ICD-10-CM

## 2023-06-06 LAB
ALBUMIN SERPL-MCNC: 3.1 G/DL (ref 3.5–5)
ALBUMIN/GLOB SERPL: 0.8 (ref 1.1–2.2)
ALP SERPL-CCNC: 75 U/L (ref 45–117)
ALT SERPL-CCNC: 31 U/L (ref 12–78)
ANION GAP SERPL CALC-SCNC: 5 MMOL/L (ref 5–15)
APPEARANCE UR: CLEAR
AST SERPL-CCNC: 14 U/L (ref 15–37)
BACTERIA URNS QL MICRO: NEGATIVE /HPF
BASOPHILS # BLD: 0 K/UL (ref 0–0.1)
BASOPHILS NFR BLD: 1 % (ref 0–1)
BILIRUB SERPL-MCNC: 0.1 MG/DL (ref 0.2–1)
BILIRUB UR QL: NEGATIVE
BUN SERPL-MCNC: 7 MG/DL (ref 6–20)
BUN/CREAT SERPL: 10 (ref 12–20)
CALCIUM SERPL-MCNC: 8.4 MG/DL (ref 8.5–10.1)
CHLORIDE SERPL-SCNC: 108 MMOL/L (ref 97–108)
CLUE CELLS VAG QL WET PREP: NORMAL
CO2 SERPL-SCNC: 27 MMOL/L (ref 21–32)
COLOR UR: NORMAL
COMMENT:: NORMAL
CREAT SERPL-MCNC: 0.67 MG/DL (ref 0.55–1.02)
DIFFERENTIAL METHOD BLD: ABNORMAL
EOSINOPHIL # BLD: 0.1 K/UL (ref 0–0.4)
EOSINOPHIL NFR BLD: 2 % (ref 0–7)
EPITH CASTS URNS QL MICRO: NORMAL /LPF
ERYTHROCYTE [DISTWIDTH] IN BLOOD BY AUTOMATED COUNT: 18.1 % (ref 11.5–14.5)
GLOBULIN SER CALC-MCNC: 4.1 G/DL (ref 2–4)
GLUCOSE SERPL-MCNC: 101 MG/DL (ref 65–100)
GLUCOSE UR STRIP.AUTO-MCNC: NEGATIVE MG/DL
HCG SERPL QL: NEGATIVE
HCG UR QL: NEGATIVE
HCT VFR BLD AUTO: 27.3 % (ref 35–47)
HGB BLD-MCNC: 8 G/DL (ref 11.5–16)
HGB UR QL STRIP: NEGATIVE
HYALINE CASTS URNS QL MICRO: NORMAL /LPF (ref 0–2)
IMM GRANULOCYTES # BLD AUTO: 0 K/UL (ref 0–0.04)
IMM GRANULOCYTES NFR BLD AUTO: 0 % (ref 0–0.5)
KETONES UR QL STRIP.AUTO: NEGATIVE MG/DL
KOH PREP SPEC: NORMAL
LEUKOCYTE ESTERASE UR QL STRIP.AUTO: NEGATIVE
LIPASE SERPL-CCNC: 155 U/L (ref 73–393)
LYMPHOCYTES # BLD: 1.9 K/UL (ref 0.8–3.5)
LYMPHOCYTES NFR BLD: 31 % (ref 12–49)
MCH RBC QN AUTO: 21.4 PG (ref 26–34)
MCHC RBC AUTO-ENTMCNC: 29.3 G/DL (ref 30–36.5)
MCV RBC AUTO: 73 FL (ref 80–99)
MONOCYTES # BLD: 0.5 K/UL (ref 0–1)
MONOCYTES NFR BLD: 8 % (ref 5–13)
NEUTS SEG # BLD: 3.4 K/UL (ref 1.8–8)
NEUTS SEG NFR BLD: 58 % (ref 32–75)
NITRITE UR QL STRIP.AUTO: NEGATIVE
NRBC # BLD: 0 K/UL (ref 0–0.01)
NRBC BLD-RTO: 0 PER 100 WBC
PH UR STRIP: 7.5 (ref 5–8)
PLATELET # BLD AUTO: 546 K/UL (ref 150–400)
PMV BLD AUTO: 9 FL (ref 8.9–12.9)
POTASSIUM SERPL-SCNC: 3.7 MMOL/L (ref 3.5–5.1)
PROT SERPL-MCNC: 7.2 G/DL (ref 6.4–8.2)
PROT UR STRIP-MCNC: NEGATIVE MG/DL
RBC # BLD AUTO: 3.74 M/UL (ref 3.8–5.2)
RBC #/AREA URNS HPF: NORMAL /HPF (ref 0–5)
SERVICE CMNT-IMP: NORMAL
SODIUM SERPL-SCNC: 140 MMOL/L (ref 136–145)
SP GR UR REFRACTOMETRY: 1.01
SPECIMEN HOLD: NORMAL
T VAGINALIS VAG QL WET PREP: NORMAL
URINE CULTURE IF INDICATED: NORMAL
UROBILINOGEN UR QL STRIP.AUTO: 0.2 EU/DL (ref 0.2–1)
WBC # BLD AUTO: 5.9 K/UL (ref 3.6–11)
WBC URNS QL MICRO: NORMAL /HPF (ref 0–4)

## 2023-06-06 PROCEDURE — 6360000004 HC RX CONTRAST MEDICATION: Performed by: EMERGENCY MEDICINE

## 2023-06-06 PROCEDURE — 74177 CT ABD & PELVIS W/CONTRAST: CPT

## 2023-06-06 PROCEDURE — 87210 SMEAR WET MOUNT SALINE/INK: CPT

## 2023-06-06 PROCEDURE — 36415 COLL VENOUS BLD VENIPUNCTURE: CPT

## 2023-06-06 PROCEDURE — 99285 EMERGENCY DEPT VISIT HI MDM: CPT

## 2023-06-06 PROCEDURE — 81001 URINALYSIS AUTO W/SCOPE: CPT

## 2023-06-06 PROCEDURE — 84703 CHORIONIC GONADOTROPIN ASSAY: CPT

## 2023-06-06 PROCEDURE — 83690 ASSAY OF LIPASE: CPT

## 2023-06-06 PROCEDURE — 6370000000 HC RX 637 (ALT 250 FOR IP): Performed by: EMERGENCY MEDICINE

## 2023-06-06 PROCEDURE — 76830 TRANSVAGINAL US NON-OB: CPT

## 2023-06-06 PROCEDURE — 87491 CHLMYD TRACH DNA AMP PROBE: CPT

## 2023-06-06 PROCEDURE — 85025 COMPLETE CBC W/AUTO DIFF WBC: CPT

## 2023-06-06 PROCEDURE — 87591 N.GONORRHOEAE DNA AMP PROB: CPT

## 2023-06-06 PROCEDURE — 81025 URINE PREGNANCY TEST: CPT

## 2023-06-06 PROCEDURE — 80053 COMPREHEN METABOLIC PANEL: CPT

## 2023-06-06 RX ORDER — FERROUS SULFATE 325(65) MG
325 TABLET ORAL
Qty: 60 TABLET | Refills: 1 | Status: SHIPPED | OUTPATIENT
Start: 2023-06-06

## 2023-06-06 RX ORDER — DICYCLOMINE HCL 20 MG
20 TABLET ORAL
Status: COMPLETED | OUTPATIENT
Start: 2023-06-06 | End: 2023-06-06

## 2023-06-06 RX ORDER — DICYCLOMINE HCL 20 MG
20 TABLET ORAL 4 TIMES DAILY PRN
Qty: 20 TABLET | Refills: 0 | Status: SHIPPED | OUTPATIENT
Start: 2023-06-06

## 2023-06-06 RX ADMIN — IOPAMIDOL 100 ML: 755 INJECTION, SOLUTION INTRAVENOUS at 15:28

## 2023-06-06 RX ADMIN — DICYCLOMINE HYDROCHLORIDE 20 MG: 20 TABLET ORAL at 15:10

## 2023-06-06 ASSESSMENT — PAIN DESCRIPTION - ORIENTATION: ORIENTATION: LOWER

## 2023-06-06 ASSESSMENT — PAIN DESCRIPTION - LOCATION: LOCATION: ABDOMEN

## 2023-06-06 ASSESSMENT — PAIN SCALES - GENERAL: PAINLEVEL_OUTOF10: 7

## 2023-06-06 NOTE — ED PROVIDER NOTES
your doctor about these medications      ascorbic acid 250 MG tablet  Commonly known as: VITAMIN C     baclofen 10 MG tablet  Commonly known as: LIORESAL     cyanocobalamin 1000 MCG tablet     fluticasone 50 MCG/ACT nasal spray  Commonly known as: FLONASE     gabapentin 300 MG capsule  Commonly known as: NEURONTIN     ketotifen 0.025 % ophthalmic solution  Commonly known as: ZADITOR     montelukast 10 MG tablet  Commonly known as: SINGULAIR     OXcarbazepine 600 MG tablet  Commonly known as: TRILEPTAL     prednisoLONE acetate 1 % ophthalmic suspension  Commonly known as: PRED FORTE     vitamin D3 125 MCG (5000 UT) Tabs tablet  Commonly known as: CHOLECALCIFEROL     ZyrTEC Allergy 10 MG Caps  Generic drug: Cetirizine HCl               Where to Get Your Medications        These medications were sent to [place] [phone] J[facility] [phone]  [address] [place] [address] Drive [address]      Phone: [phone]   dicyclomine 20 MG tablet  ferrous sulfate 325 (65 Fe) MG tablet           DISCONTINUED MEDICATIONS:  Current Discharge Medication List          I am the Primary Clinician of Record. Gladis Morris MD (electronically signed)    (Please note that parts of this dictation were completed with voice recognition software. Quite often unanticipated grammatical, syntax, homophones, and other interpretive errors are inadvertently transcribed by the computer software. Please disregards these errors.  Please excuse any errors that have escaped final proofreading.)        Gladis Morris MD  06/07/23 0048

## 2023-06-07 LAB
C TRACH DNA SPEC QL NAA+PROBE: POSITIVE
N GONORRHOEA DNA SPEC QL NAA+PROBE: POSITIVE
SAMPLE TYPE: ABNORMAL
SERVICE CMNT-IMP: ABNORMAL
SPECIMEN SOURCE: ABNORMAL

## 2023-06-09 ENCOUNTER — HOSPITAL ENCOUNTER (EMERGENCY)
Facility: HOSPITAL | Age: 43
Discharge: HOME | End: 2023-06-09
Attending: EMERGENCY MEDICINE
Payer: COMMERCIAL

## 2023-06-09 VITALS
WEIGHT: 223.77 LBS | TEMPERATURE: 99 F | RESPIRATION RATE: 14 BRPM | HEART RATE: 78 BPM | SYSTOLIC BLOOD PRESSURE: 135 MMHG | OXYGEN SATURATION: 100 % | DIASTOLIC BLOOD PRESSURE: 86 MMHG | BODY MASS INDEX: 37.24 KG/M2

## 2023-06-09 DIAGNOSIS — N73.9 FEMALE PELVIC INFLAMMATORY DISEASE: Primary | ICD-10-CM

## 2023-06-09 PROCEDURE — 6360000002 HC RX W HCPCS: Performed by: EMERGENCY MEDICINE

## 2023-06-09 PROCEDURE — 6370000000 HC RX 637 (ALT 250 FOR IP): Performed by: EMERGENCY MEDICINE

## 2023-06-09 RX ORDER — CEFTRIAXONE 500 MG/1
500 INJECTION, POWDER, FOR SOLUTION INTRAMUSCULAR; INTRAVENOUS
Status: COMPLETED | OUTPATIENT
Start: 2023-06-09 | End: 2023-06-09

## 2023-06-09 RX ORDER — DOXYCYCLINE HYCLATE 100 MG
100 TABLET ORAL 2 TIMES DAILY
Qty: 14 TABLET | Refills: 0 | Status: SHIPPED | OUTPATIENT
Start: 2023-06-09 | End: 2023-06-09 | Stop reason: SDUPTHER

## 2023-06-09 RX ORDER — DOXYCYCLINE HYCLATE 100 MG
100 TABLET ORAL 2 TIMES DAILY
Qty: 28 TABLET | Refills: 0 | Status: SHIPPED | OUTPATIENT
Start: 2023-06-09 | End: 2023-06-23

## 2023-06-09 RX ORDER — DOXYCYCLINE HYCLATE 100 MG
100 TABLET ORAL
Status: COMPLETED | OUTPATIENT
Start: 2023-06-09 | End: 2023-06-09

## 2023-06-09 RX ADMIN — CEFTRIAXONE SODIUM 500 MG: 500 INJECTION, POWDER, FOR SOLUTION INTRAMUSCULAR; INTRAVENOUS at 11:46

## 2023-06-09 RX ADMIN — DOXYCYCLINE HYCLATE 100 MG: 100 TABLET, COATED ORAL at 11:46

## 2023-06-09 ASSESSMENT — PAIN SCALES - GENERAL: PAINLEVEL_OUTOF10: 10

## 2023-06-09 NOTE — ED PROVIDER NOTES
tablet by mouth daily (with breakfast)     fluticasone 50 MCG/ACT nasal spray  Commonly known as: FLONASE     gabapentin 300 MG capsule  Commonly known as: NEURONTIN     ketotifen 0.025 % ophthalmic solution  Commonly known as: ZADITOR     montelukast 10 MG tablet  Commonly known as: SINGULAIR     OXcarbazepine 600 MG tablet  Commonly known as: TRILEPTAL     prednisoLONE acetate 1 % ophthalmic suspension  Commonly known as: PRED FORTE     vitamin D3 125 MCG (5000 UT) Tabs tablet  Commonly known as: CHOLECALCIFEROL     ZyrTEC Allergy 10 MG Caps  Generic drug: Cetirizine HCl               Where to Get Your Medications        These medications were sent to [facility] [address] [place] [address] [phone] Illa Delay [phone]  [address] [facility] [place], [address] [address]      Phone: [phone]   doxycycline hyclate 100 MG tablet           DISCONTINUED MEDICATIONS:  Discharge Medication List as of 6/9/2023 11:38 AM          I am the Primary Clinician of Record. Peter Mills MD (electronically signed)    (Please note that parts of this dictation were completed with voice recognition software. Quite often unanticipated grammatical, syntax, homophones, and other interpretive errors are inadvertently transcribed by the computer software. Please disregards these errors.  Please excuse any errors that have escaped final proofreading.)       Peter Mills MD  06/09/23 3603

## 2023-07-26 ENCOUNTER — HOSPITAL ENCOUNTER (OUTPATIENT)
Facility: HOSPITAL | Age: 43
Discharge: HOME | End: 2023-07-29
Payer: COMMERCIAL

## 2023-07-26 DIAGNOSIS — N93.8 DYSFUNCTIONAL UTERINE BLEEDING: ICD-10-CM

## 2023-07-26 DIAGNOSIS — R10.84 ABDOMINAL PAIN, GENERALIZED: ICD-10-CM

## 2023-07-26 DIAGNOSIS — D50.0 ANEMIA DUE TO BLOOD LOSS: ICD-10-CM

## 2023-07-26 DIAGNOSIS — D25.9 UTERINE LEIOMYOMA, UNSPECIFIED LOCATION: ICD-10-CM

## 2023-07-26 PROCEDURE — A9579 GAD-BASE MR CONTRAST NOS,1ML: HCPCS | Performed by: NURSE PRACTITIONER

## 2023-07-26 PROCEDURE — 72197 MRI PELVIS W/O & W/DYE: CPT

## 2023-07-26 PROCEDURE — 6360000004 HC RX CONTRAST MEDICATION: Performed by: NURSE PRACTITIONER

## 2023-07-26 RX ADMIN — GADOTERIDOL 20 ML: 279.3 INJECTION, SOLUTION INTRAVENOUS at 16:23

## 2023-09-12 DIAGNOSIS — G50.0 TRIGEMINAL NEURALGIA: Primary | ICD-10-CM

## 2023-09-13 RX ORDER — GABAPENTIN 300 MG/1
CAPSULE ORAL
Qty: 60 CAPSULE | Refills: 1 | Status: SHIPPED | OUTPATIENT
Start: 2023-09-13 | End: 2023-11-12

## 2023-09-27 ENCOUNTER — TELEPHONE (OUTPATIENT)
Age: 43
End: 2023-09-27

## 2023-09-27 ENCOUNTER — TELEMEDICINE (OUTPATIENT)
Age: 43
End: 2023-09-27
Payer: COMMERCIAL

## 2023-09-27 DIAGNOSIS — G50.0 TRIGEMINAL NEURALGIA OF RIGHT SIDE OF FACE: Primary | ICD-10-CM

## 2023-09-27 PROCEDURE — 99204 OFFICE O/P NEW MOD 45 MIN: CPT | Performed by: PSYCHIATRY & NEUROLOGY

## 2023-09-27 RX ORDER — BACLOFEN 10 MG/1
10 TABLET ORAL 3 TIMES DAILY
Qty: 90 TABLET | Refills: 5 | Status: SHIPPED | OUTPATIENT
Start: 2023-09-27

## 2023-09-27 RX ORDER — OXCARBAZEPINE 600 MG/1
600 TABLET, FILM COATED ORAL 2 TIMES DAILY
Qty: 180 TABLET | Refills: 3 | Status: SHIPPED | OUTPATIENT
Start: 2023-09-27

## 2023-09-27 NOTE — ASSESSMENT & PLAN NOTE
Patient is to continue on gabapentin 300 mg up to twice daily along with baclofen 10 mg up to 3 times daily and Trileptal 600 mg up to twice daily    Patient generally requires 1 tablet of each at bedtime but does have side effects from doing so    I have suggested trying to stratify the medications even if she just takes one of the tablets at an earlier point in time perhaps dinner or take a half a tablet in the morning and a half a tablet at dinner which may then reduce side effect profile    Prescriptions have been renewed today for the baclofen and Trileptal  Gabapentin was previously prescribed September 13

## 2023-09-27 NOTE — PROGRESS NOTES
Patient identified with two identification factors, Name and Date of Birth. Chief Complaint   Patient presents with    Tremors    Facial Pain     Tremor and facial pain f/u. No improvement since LOV. There were no vitals taken for this visit. Health Maintenance Due   Topic    Hepatitis B vaccine (1 of 3 - 3-dose series)    Varicella vaccine (1 of 2 - 2-dose childhood series)    HIV screen     Hepatitis C screen     Cervical cancer screen     Diabetes screen     Lipids     COVID-19 Vaccine (4 - Pfizer series)    Depression Screen     Flu vaccine (1)        1. \"Have you been to the ER, urgent care clinic since your last visit? Hospitalized since your last visit? \" no    2. \"Have you seen or consulted any other health care providers outside of the [address] [place] [address] since your last visit? \" no
mass, hemorrhage or evidence of acute infarction. No Known Allergies    Current Outpatient Medications   Medication Sig Dispense Refill    OXcarbazepine (TRILEPTAL) 600 MG tablet Take 1 tablet by mouth 2 times daily 180 tablet 3    baclofen (LIORESAL) 10 MG tablet Take 1 tablet by mouth 3 times daily 90 tablet 5    gabapentin (NEURONTIN) 300 MG capsule TAKE 1 CAPSULE BY MOUTH TWICE DAILY. MAX DAILY AMOUNT: 600 MG 60 capsule 1    ferrous sulfate (IRON 325) 325 (65 Fe) MG tablet Take 1 tablet by mouth daily (with breakfast) 60 tablet 1    ascorbic acid (VITAMIN C) 250 MG tablet Take by mouth      Cetirizine HCl (ZYRTEC ALLERGY) 10 MG CAPS Take by mouth       No current facility-administered medications for this visit. Past medical history/surgical history, family history, and social history have been reviewed for today's visit      ROS    A ten system review of constitutional, cardiovascular, respiratory, musculoskeletal, endocrine, skin, SHEENT, genitourinary, psychiatric and neurologic systems was obtained and is unremarkable except as mentioned under HPI          EXAMINATION: Within the context of a virtual visit    Visit Vitals  There were no vitals filed for this visit. General appearance: Patient is well-developed and well-nourished in no apparent distress and well groomed.     Psych/mental health:  Affect: Appropriate    PHQ  No data recorded      HEENT:   Normocephalic  With evidence of trauma: No  Full range of motion head neck: Yes  Tenderness to palpation of the head neck region: No      Cardiovascular:     Extremities warm to touch: Yes  Extremity swelling: No  Discoloration: No  Evidence of PVD: No    Respiratory:   Dyspnea on exertion: No   Abnormal effort on casual observation: No   Use of portable oxygen: No   Evidence of cyanosis: No     Musculoskeletal:   Evidence of significant bone deformities: No   Spinal curvature: No     Integumentary:    Obvious bruising: No   Lacerations

## 2023-09-27 NOTE — TELEPHONE ENCOUNTER
Pt is wondering can her next fu be virtual, please message via [employer].

## 2023-09-27 NOTE — PATIENT INSTRUCTIONS
As per our discussion    I have renewed the baclofen 10 mg 1 tablet 3 times a day although I realize you are only taking 1 a day and with the Trileptal I have renewed it at 600 mg 1 tablet twice a day although I know again you are only taking it once a day    As we discussed since you are taking the gabapentin Trileptal and baclofen all at bedtime you may want to try to at least pull 1 of those off and take it earlier whether you take it in the morning or maybe at dinnertime just to give you better coverage and less side effects    You can try even taking baclofen half a tablet in the morning and a half a tablet at dinnertime and then continuing the gabapentin and Trileptal at bedtime    Another alternative would be to take the Trileptal and take half at breakfast and a half at dinner and then use the gabapentin and baclofen at bedtime so those are some ways that you can try to mix it up a little bit to get you better coverage throughout the day and less side effects however you can continue to take your medicine as you have been doing presently at this time meaning taking them all at bedtime    But I like to try to get you to the best coverage with the least amount of side effects        Office Policies    Phone calls/patient messages:  Please allow up to 24 hours for someone in the office to contact you about your call or message. Be mindful your provider may be out of the office or your message may require further review. We encourage you to use [employer] for your messages as this is a faster, more efficient way to communicate with our office    Medication Refills:  Prescription medications require up to 48 business hours to process. We encourage you to use [employer] for your refills. For controlled medications: Please allow up to 72 business hours to process. Certain medications may require you to  a written prescription at our office.     NO narcotic/controlled medications will be prescribed after 4pm

## 2023-09-28 ENCOUNTER — PATIENT MESSAGE (OUTPATIENT)
Age: 43
End: 2023-09-28

## 2024-02-14 DIAGNOSIS — G50.0 TRIGEMINAL NEURALGIA OF RIGHT SIDE OF FACE: ICD-10-CM

## 2024-02-15 RX ORDER — OXCARBAZEPINE 600 MG/1
600 TABLET, FILM COATED ORAL 2 TIMES DAILY
Qty: 180 TABLET | Refills: 3 | Status: SHIPPED | OUTPATIENT
Start: 2024-02-15

## 2024-04-05 ENCOUNTER — TELEMEDICINE (OUTPATIENT)
Age: 44
End: 2024-04-05
Payer: COMMERCIAL

## 2024-04-05 DIAGNOSIS — G50.0 TRIGEMINAL NEURALGIA OF RIGHT SIDE OF FACE: Primary | ICD-10-CM

## 2024-04-05 PROCEDURE — 99213 OFFICE O/P EST LOW 20 MIN: CPT | Performed by: PSYCHIATRY & NEUROLOGY

## 2024-04-05 RX ORDER — BACLOFEN 10 MG/1
10 TABLET ORAL 3 TIMES DAILY
Qty: 270 TABLET | Refills: 3 | Status: SHIPPED | OUTPATIENT
Start: 2024-04-05

## 2024-04-05 RX ORDER — GABAPENTIN 300 MG/1
CAPSULE ORAL
Qty: 180 CAPSULE | Refills: 1 | Status: SHIPPED | OUTPATIENT
Start: 2024-04-05 | End: 2024-10-02

## 2024-04-05 ASSESSMENT — PATIENT HEALTH QUESTIONNAIRE - PHQ9
SUM OF ALL RESPONSES TO PHQ QUESTIONS 1-9: 0
1. LITTLE INTEREST OR PLEASURE IN DOING THINGS: NOT AT ALL
SUM OF ALL RESPONSES TO PHQ QUESTIONS 1-9: 0

## 2024-04-05 NOTE — ASSESSMENT & PLAN NOTE
Patient is to continue on gabapentin 300 mg up to twice daily along with baclofen 10 mg up to 3 times daily and Trileptal 600 mg up to twice daily    Gabapentin and baclofen were renewed today Trileptal was previously renewed in February 2024    Patient states lab work has been completed by primary care routinely and no concerns  Discussed with her that Trileptal can reduce sodium levels and to make sure that primary care monitors that over time

## 2024-04-05 NOTE — PATIENT INSTRUCTIONS
As a reminder:   Please come to your appointment 15 minutes before your office appointment.  This way, you can get checked in at the  and checked in by the nursing staff so you have the full allotment of time with your provider for your visit.  Please bring an up-to-date and accurate list of all your medications.  Or bring all your active prescription bottles with you at the time of your office visit and this includes over-the-counter medications so we can make sure that your medication list is up-to-date.        As per discussion  Overall you are doing well I would not recommend any changes in treatment or intervention at this time I have renewed the gabapentin I have also renewed the baclofen        Office Policies    Phone calls/patient messages:  Please allow up to 48 hours for someone in the office to contact you about your call or message. Be mindful your provider may be out of the office or your message may require further review. We encourage you to use [employer] for your messages as this is a faster, more efficient way to communicate with our office    Medication Refills:  Prescription medications require up to 48 business hours to process. We encourage you to use [employer] for your refills.     For controlled medications: Please allow up to 72 business hours to process. Certain medications may require you to  a written prescription at our office.    NO narcotic/controlled medications will be prescribed after 4pm Monday through Friday or on weekends    Form/Paperwork Completion:  We ask that you allow 7-14 business days. You may also download your forms to [employer] to have your doctor print off.

## 2024-04-05 NOTE — PROGRESS NOTES
Trigeminal neuralgia has been manageable  Handling meds well    
also verbalized agreement and understanding of the above-stated plan      Return in about 1 year (around 4/5/2025) for VV.        ICD-10-CM    1. Trigeminal neuralgia of right side of face  G50.0 baclofen (LIORESAL) 10 MG tablet    Has received glycerol injections with Dr Warren has been able to reduce but not get off her medications              HPI  Historical Data  Patient is known to the practice and was previously seen by Dr. Nagel    Neurologic diagnosis  Trigeminal neuralgia   Failed gamma knife surgery in 2021    Within a month with symptoms return   Onset of trigeminal neuralgia occurred April 2019 after a pulled tooth in the right upper jaw    Other significant comorbid conditions/concerns  Obesity  Eczema  History of venous stasis dermatitis      Interim Data:     Trigeminal neuralgia: right   Glyceral rhyzolysis completed by Dr. Warren pain improved but still symptomatic requiring medication as she still continues to get twinges and some breakthrough pain    However her medication use has significantly declined but still needs the following:   Gabapentin 300 mg twice daily but generally takes 1 at bedtime only   Baclofen 10 mg 1 tablet 3 times a day but generally takes just 1 at bedtime   Trileptal 600 mg 1 twice daily and again takes 1 at bedtime                    Pertinent diagnostic data      Results from Hospital Encounter encounter on 10/28/20    MRI BRAIN W WO CONT    Impression  IMPRESSION:  No acute intracranial process.    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  No skull base mass lesion.  No abnormal cranial nerve enhancement or displacement.  No intracranial mass, hemorrhage or evidence of acute infarction.        No Known Allergies    Current Outpatient Medications   Medication Sig Dispense Refill    gabapentin (NEURONTIN) 300 MG capsule TAKE 1 CAPSULE BY MOUTH TWICE DAILY. MAX DAILY AMOUNT: 600  capsule

## 2024-06-10 ENCOUNTER — TELEPHONE (OUTPATIENT)
Age: 44
End: 2024-06-10

## 2024-06-10 DIAGNOSIS — G50.0 TRIGEMINAL NEURALGIA OF RIGHT SIDE OF FACE: Primary | ICD-10-CM

## 2024-06-10 NOTE — TELEPHONE ENCOUNTER
Patient stated her Dr. Warren, her neurosurgeon specialist would like us to increase the gabapentin to three times a day because her pain has come back on the right side of her face last week. She stated she was brushing her teeth when it started.    If able to refill, please send prescription to [address] Please give the patient a call back at . Thank you.

## 2024-06-11 NOTE — TELEPHONE ENCOUNTER
Last office visit 4/5/2024  Next office visit 4/7/2025  Last med refill 4/5/2024    Patient requesting medication increase to 3 times a day due to the facial pain returning

## 2024-06-12 RX ORDER — GABAPENTIN 300 MG/1
CAPSULE ORAL
Qty: 270 CAPSULE | Refills: 1 | Status: SHIPPED | OUTPATIENT
Start: 2024-06-12 | End: 2024-12-08

## 2024-06-12 NOTE — TELEPHONE ENCOUNTER
I did renew the medication.  Please set them up with a virtual visit with [facility] for med check to make sure this is working well and we do not need to make any further adjustments   Rigo note to me

## 2024-08-27 DIAGNOSIS — G50.0 TRIGEMINAL NEURALGIA OF RIGHT SIDE OF FACE: ICD-10-CM

## 2024-08-27 RX ORDER — BACLOFEN 10 MG/1
10 TABLET ORAL 3 TIMES DAILY
Qty: 270 TABLET | Refills: 3 | OUTPATIENT
Start: 2024-08-27

## 2024-09-12 ENCOUNTER — SCHEDULED TELEPHONE ENCOUNTER (OUTPATIENT)
Age: 44
End: 2024-09-12
Payer: COMMERCIAL

## 2024-09-12 DIAGNOSIS — G50.0 TRIGEMINAL NEURALGIA OF RIGHT SIDE OF FACE: Primary | ICD-10-CM

## 2024-09-12 PROCEDURE — 99442 PR PHYS/QHP TELEPHONE EVALUATION 11-20 MIN: CPT | Performed by: NURSE PRACTITIONER

## 2024-09-12 RX ORDER — LAMOTRIGINE 25 MG/1
TABLET ORAL
Qty: 120 TABLET | Refills: 3 | Status: SHIPPED | OUTPATIENT
Start: 2024-09-12

## 2024-11-26 ENCOUNTER — CLINICAL DOCUMENTATION (OUTPATIENT)
Age: 44
End: 2024-11-26

## 2024-11-26 ENCOUNTER — TELEMEDICINE (OUTPATIENT)
Age: 44
End: 2024-11-26
Payer: COMMERCIAL

## 2024-11-26 DIAGNOSIS — G50.0 TRIGEMINAL NEURALGIA OF RIGHT SIDE OF FACE: ICD-10-CM

## 2024-11-26 PROCEDURE — 99214 OFFICE O/P EST MOD 30 MIN: CPT | Performed by: NURSE PRACTITIONER

## 2024-11-26 RX ORDER — LAMOTRIGINE 25 MG/1
TABLET ORAL
Qty: 360 TABLET | Refills: 1 | Status: SHIPPED | OUTPATIENT
Start: 2024-11-26

## 2024-11-26 NOTE — ASSESSMENT & PLAN NOTE
Patient with trigeminal neuralgia, feels it is well-controlled on current medication regimen. She had failed gamma knife surgery, does receive glycerol rhizolysis by Dr. Warren. Beginning in June 2024 had worsening right facial pain.  Gabapentin was increased to 300 mg 3 times a day and she continued on her Trileptal 600 mg twice a day as well as baclofen as needed.  She was getting some relief but continued to have right facial pain, initially had some sedation from the increased dose of gabapentin, but states now she does not feel she is having any sedation from the gabapentin.  Lamictal was added to medication regimen last visit (9/2024)     Adding Lamictal to her medication regimen has caused no sedation.  She will continue on 50 mg twice a day  We discussed weaning down the gabapentin.  She does not feel the gabapentin is making her sleepy and her pain is well-controlled so she would like to continue taking it 300 mg 3 times a day  She feels the Trileptal makes her more sleepy than the gabapentin.  However as she has good control over the pain related to her trigeminal neuralgia she does not want to wean off of the Trileptal.  For the time being we will continue she will continue taking her Trileptal 600 mg twice a day  Will order blood work, including thyroid studies due to concerns over hair loss

## 2024-11-26 NOTE — PROGRESS NOTES
x 3.  Speech is clear.  Speech pattern is fluent.  She is reliable historian following simple one-step as well as more complex two-step commands.    Cranial Nerves:  I: smell Not tested   II: visual fields Not assessed   II: pupils Unable to reliably assess due to limitations of telehealth   II: optic disc Not assessed   III,VII: ptosis none noted   III,IV,VI: extraocular muscles  Full ROM, gaze is conjugate   V: mastication Normal, speech is clear, swallowing without difficulty   V: facial light touch sensation  Denies any lateralizing sensory deficit or paresthesias to the face   VII: facial muscle function   symmetric   VIII: hearing symmetric   IX: soft palate elevation  Not assessed   XI: trapezius strength  Not assessed   XI: sternocleidomastoid strength Not assessed   XI: neck flexion strength  Not assessed   XII: tongue  Not assessed    -Motor: Not assessed as this is a telehealth visit.  Denies any lateralizing motor deficit or generalized weakness. Upon observation: normal bulk and tone is noted, do not appreciate tremor.     -Sensation: Patient denies any numbness or tingling in the bilateral upper extremities as well as bilateral lower extremities.   -Cerebellar Testing: Denies any feelings of imbalance.  Patient's movements appear to be smooth and purposeful   -DTRs: Unable to assess due to telehealth visit   -Gait: Ambulates without assist device.  No falls.    I spent at least 30 minutes on this visit with this established patient.    We discussed the expected course, resolution and complications of the diagnosis(es) in detail.  Medication risks, benefits, costs, interactions, and alternatives were discussed as indicated.  I advised her to contact the office if her condition worsens, changes or fails to improve as anticipated. She expressed understanding with the diagnosis(es) and plan.     Denisse Santillan, was evaluated through a synchronous (real-time) audio-video encounter. The patient (or

## 2024-12-03 DIAGNOSIS — G50.0 TRIGEMINAL NEURALGIA OF RIGHT SIDE OF FACE: ICD-10-CM

## 2024-12-03 NOTE — TELEPHONE ENCOUNTER
Last office visit 11/26/2024 with Joo  Next office visit 4/7/2025 with   Last med refill 6/12/2024

## 2024-12-04 RX ORDER — GABAPENTIN 300 MG/1
CAPSULE ORAL
Qty: 270 CAPSULE | Refills: 3 | Status: SHIPPED | OUTPATIENT
Start: 2024-12-04 | End: 2025-12-04

## 2025-01-28 NOTE — PROGRESS NOTES
Patient is new to this clinic. Comes in to establish care. Previous care was with  . Reason for the change is: he left ins            Denisse Santillan (:  1980) is a 44 y.o. female, New patient, here for evaluation of the following chief complaint(s):  Establish Care         Assessment & Plan  1. Alopecia areata.  Versus female pattern baldness  The differential diagnosis includes female pattern baldness. She has been advised against applying any substances to her scalp. A referral to Dr. Jodi Mata, a dermatologist, has been provided. Blood work has been ordered to check thyroid levels hormone levels and other potential causes of hair loss.    2. Trigeminal neuralgia.  She is currently on gabapentin, baclofen, Lamictal, for trigeminal neuralgia. She reports that these medications are helping significantly. No changes to her medication regimen are necessary at this time.    3. Irregular menstrual cycles.  She reports very irregular menstrual cycles with prolonged periods and large clots. A referral for a hysterectomy has been requested, and she will need to follow up with the specialist for further evaluation and management.    4. Health maintenance.  General blood work has been ordered as she has not had it done in a long time. This includes tests to monitor her current medications and overall health status.    Follow-up  The patient will follow up in a couple of months.    Results    1. Trigeminal neuralgia of right side of face  -     CBC; Future  2. Alopecia  -     External Referral To Dermatology  -     TSH; Future  -     DHEA-Sulfate; Future  -     Testosterone, free, total; Future  3. Screening for diabetes mellitus  -     Comprehensive Metabolic Panel; Future  4. Screening cholesterol level  -     Lipid Panel; Future           Orders Placed This Encounter    CBC     Standing Status:   Future     Standing Expiration Date:   2026    Comprehensive Metabolic Panel     Standing Status:

## 2025-01-29 ENCOUNTER — OFFICE VISIT (OUTPATIENT)
Facility: CLINIC | Age: 45
End: 2025-01-29
Payer: COMMERCIAL

## 2025-01-29 VITALS
RESPIRATION RATE: 16 BRPM | TEMPERATURE: 98.9 F | HEIGHT: 66 IN | HEART RATE: 70 BPM | DIASTOLIC BLOOD PRESSURE: 86 MMHG | SYSTOLIC BLOOD PRESSURE: 129 MMHG | BODY MASS INDEX: 35.68 KG/M2 | OXYGEN SATURATION: 97 % | WEIGHT: 222 LBS

## 2025-01-29 DIAGNOSIS — L65.9 ALOPECIA: ICD-10-CM

## 2025-01-29 DIAGNOSIS — Z13.220 SCREENING CHOLESTEROL LEVEL: ICD-10-CM

## 2025-01-29 DIAGNOSIS — Z13.1 SCREENING FOR DIABETES MELLITUS: ICD-10-CM

## 2025-01-29 DIAGNOSIS — G50.0 TRIGEMINAL NEURALGIA OF RIGHT SIDE OF FACE: Primary | ICD-10-CM

## 2025-01-29 PROCEDURE — 99203 OFFICE O/P NEW LOW 30 MIN: CPT | Performed by: FAMILY MEDICINE

## 2025-01-29 RX ORDER — FLUTICASONE PROPIONATE 44 UG/1
1 AEROSOL, METERED RESPIRATORY (INHALATION) 2 TIMES DAILY
COMMUNITY

## 2025-01-30 LAB
ALBUMIN SERPL-MCNC: 3.7 G/DL (ref 3.9–4.9)
ALP SERPL-CCNC: 92 IU/L (ref 44–121)
ALT SERPL-CCNC: 17 IU/L (ref 0–32)
AST SERPL-CCNC: 11 IU/L (ref 0–40)
BASOPHILS # BLD AUTO: 0.1 X10E3/UL (ref 0–0.2)
BASOPHILS NFR BLD AUTO: 1 %
BILIRUB SERPL-MCNC: 0.2 MG/DL (ref 0–1.2)
BUN SERPL-MCNC: 6 MG/DL (ref 6–24)
BUN/CREAT SERPL: 8 (ref 9–23)
CALCIUM SERPL-MCNC: 8.9 MG/DL (ref 8.7–10.2)
CHLORIDE SERPL-SCNC: 100 MMOL/L (ref 96–106)
CHOLEST SERPL-MCNC: 174 MG/DL (ref 100–199)
CO2 SERPL-SCNC: 25 MMOL/L (ref 20–29)
CREAT SERPL-MCNC: 0.77 MG/DL (ref 0.57–1)
DHEA-S SERPL-MCNC: 83.7 UG/DL (ref 57.3–279.2)
EGFRCR SERPLBLD CKD-EPI 2021: 99 ML/MIN/1.73
EOSINOPHIL # BLD AUTO: 0.2 X10E3/UL (ref 0–0.4)
EOSINOPHIL NFR BLD AUTO: 4 %
ERYTHROCYTE [DISTWIDTH] IN BLOOD BY AUTOMATED COUNT: 14.7 % (ref 11.7–15.4)
GLOBULIN SER CALC-MCNC: 2.7 G/DL (ref 1.5–4.5)
GLUCOSE SERPL-MCNC: 88 MG/DL (ref 70–99)
HCT VFR BLD AUTO: 30.7 % (ref 34–46.6)
HDLC SERPL-MCNC: 67 MG/DL
HGB BLD-MCNC: 9.4 G/DL (ref 11.1–15.9)
IMM GRANULOCYTES # BLD AUTO: 0 X10E3/UL (ref 0–0.1)
IMM GRANULOCYTES NFR BLD AUTO: 0 %
IMP & REVIEW OF LAB RESULTS: NORMAL
LDLC SERPL CALC-MCNC: 99 MG/DL (ref 0–99)
LYMPHOCYTES # BLD AUTO: 1.3 X10E3/UL (ref 0.7–3.1)
LYMPHOCYTES NFR BLD AUTO: 22 %
MCH RBC QN AUTO: 25.3 PG (ref 26.6–33)
MCHC RBC AUTO-ENTMCNC: 30.6 G/DL (ref 31.5–35.7)
MCV RBC AUTO: 83 FL (ref 79–97)
MONOCYTES # BLD AUTO: 0.6 X10E3/UL (ref 0.1–0.9)
MONOCYTES NFR BLD AUTO: 9 %
NEUTROPHILS # BLD AUTO: 3.8 X10E3/UL (ref 1.4–7)
NEUTROPHILS NFR BLD AUTO: 64 %
PLATELET # BLD AUTO: 475 X10E3/UL (ref 150–450)
POTASSIUM SERPL-SCNC: 4.4 MMOL/L (ref 3.5–5.2)
PROT SERPL-MCNC: 6.4 G/DL (ref 6–8.5)
RBC # BLD AUTO: 3.72 X10E6/UL (ref 3.77–5.28)
SODIUM SERPL-SCNC: 138 MMOL/L (ref 134–144)
SPECIMEN STATUS REPORT: NORMAL
TRIGL SERPL-MCNC: 36 MG/DL (ref 0–149)
TSH SERPL DL<=0.005 MIU/L-ACNC: 1.22 UIU/ML (ref 0.45–4.5)
VLDLC SERPL CALC-MCNC: 8 MG/DL (ref 5–40)
WBC # BLD AUTO: 6 X10E3/UL (ref 3.4–10.8)

## 2025-01-31 LAB
TESTOST FREE SERPL-MCNC: 0.6 PG/ML (ref 0–4.2)
TESTOST SERPL-MCNC: 15 NG/DL (ref 4–50)

## 2025-02-01 ENCOUNTER — TELEPHONE (OUTPATIENT)
Facility: CLINIC | Age: 45
End: 2025-02-01

## 2025-02-03 DIAGNOSIS — G50.0 TRIGEMINAL NEURALGIA OF RIGHT SIDE OF FACE: ICD-10-CM

## 2025-02-03 RX ORDER — OXCARBAZEPINE 600 MG/1
600 TABLET, FILM COATED ORAL 2 TIMES DAILY
Qty: 180 TABLET | Refills: 3 | Status: SHIPPED | OUTPATIENT
Start: 2025-02-03

## 2025-02-04 ENCOUNTER — TELEPHONE (OUTPATIENT)
Facility: CLINIC | Age: 45
End: 2025-02-04

## 2025-02-27 ENCOUNTER — TELEPHONE (OUTPATIENT)
Facility: CLINIC | Age: 45
End: 2025-02-27

## 2025-02-27 DIAGNOSIS — D50.0 IRON DEFICIENCY ANEMIA DUE TO CHRONIC BLOOD LOSS: Primary | ICD-10-CM

## 2025-02-27 RX ORDER — FERROUS SULFATE 325(65) MG
325 TABLET ORAL 2 TIMES DAILY
Qty: 200 TABLET | Refills: 2 | Status: SHIPPED | OUTPATIENT
Start: 2025-02-27

## 2025-02-27 NOTE — TELEPHONE ENCOUNTER
Called her, increase her iron to twice a day.  Hemoglobin is improving a little she might want to call her gynecologist to see if something can be done about her heavy periods

## 2025-02-27 NOTE — TELEPHONE ENCOUNTER
Please call patient back about her lab results. She came into the office and also returned the phone call.    Thankyou

## 2025-04-07 ENCOUNTER — SCHEDULED TELEPHONE ENCOUNTER (OUTPATIENT)
Age: 45
End: 2025-04-07
Payer: COMMERCIAL

## 2025-04-07 DIAGNOSIS — G50.0 TRIGEMINAL NEURALGIA OF RIGHT SIDE OF FACE: Primary | ICD-10-CM

## 2025-04-07 DIAGNOSIS — E55.9 VITAMIN D DEFICIENCY: ICD-10-CM

## 2025-04-07 DIAGNOSIS — D64.89 ANEMIA DUE TO OTHER CAUSE, NOT CLASSIFIED: ICD-10-CM

## 2025-04-07 PROCEDURE — 99213 OFFICE O/P EST LOW 20 MIN: CPT | Performed by: PSYCHIATRY & NEUROLOGY

## 2025-04-07 NOTE — ASSESSMENT & PLAN NOTE
Encourage patient to normalize her vitamin D.  From a neurologic perspective I would like to target vitamin D levels greater than 40 and actually closer to 60.  Low vitamin D can agitate pain so it is important that she remains in strong therapeutic range

## 2025-04-07 NOTE — PROGRESS NOTES
Denisse PENA [facility] [place] is a 45 y.o. female    Chief Complaint   Patient presents with    Follow-up     Trigeminal neuralgia of right side of face         Health Maintenance Due   Topic Date Due    Varicella vaccine (1 of 2 - 13+ 2-dose series) Never done    HIV screen  Never done    Hepatitis C screen  Never done    Hepatitis B vaccine (1 of 3 - 19+ 3-dose series) Never done    Cervical cancer screen  Never done    Breast cancer screen  Never done    COVID-19 Vaccine (4 - 2024-25 season) 09/01/2024    Depression Screen  04/05/2025         \"Have you been to the ER, urgent care clinic since your last visit?  Hospitalized since your last visit?\"    NO    “Have you seen or consulted any other health care providers outside of [facility] since your last visit?”    NO            
was located in a state where the provider was licensed to provide care.  The patient was located at: Other: at work in the state [facility]  The provider was located at: Facility (Appt Dept): [address] [facility] [address] [address]  Confirm you are appropriately licensed, registered, or certified to deliver care in the state where the patient is located as indicated above. If you are not or unsure, please re-schedule the visit: Yes, I confirm.     Total Time: minutes: 11-20 minutes    Joan Jose, ANP

## 2025-04-07 NOTE — PATIENT INSTRUCTIONS
the office to contact you about your call or message. Be mindful your provider may be out of the office or your message may require further review. We encourage you to use [employer] for your messages as this is a faster, more efficient way to communicate with our office    Medication Refills:  Prescription medications require up to 48 business hours to process. We encourage you to use [employer] for your refills.     For controlled medications: Please allow up to 72 business hours to process. Certain medications may require you to  a written prescription at our office.    NO narcotic/controlled medications will be prescribed after 4pm Monday through Friday or on weekends    Form/Paperwork Completion:  We ask that you allow 7-14 business days.  Forms can be downloaded to [employer] or you can have them faxed, mailed, or you can bring them in to our office directly.

## 2025-04-07 NOTE — ASSESSMENT & PLAN NOTE
Improved although still somewhat problematic.  Continue on medications unchanged:  Gabapentin 300 mg 3 times daily  Baclofen 10 mg 3 times daily  Trileptal 600 mg twice daily  Lamotrigine 25 mg 2 tablets twice daily    Continue to follow-up with Dr Warren as needed    And continue to maximize and streamline other medical conditions to maintain best health to include iron and vitamin D

## 2025-04-07 NOTE — ASSESSMENT & PLAN NOTE
Continues to be anemic due to heavy menstrual cycles.  States that her iron was recently increased.  We talked about anemia and how this can play into neurologic pathology.  It is certainly not the cause of her trigeminal neuralgia but it is part of the equation and I have asked her to work to keep her iron counts as close to normal as possible.

## 2025-06-03 DIAGNOSIS — G50.0 TRIGEMINAL NEURALGIA OF RIGHT SIDE OF FACE: ICD-10-CM

## 2025-06-05 RX ORDER — BACLOFEN 10 MG/1
10 TABLET ORAL 3 TIMES DAILY
Qty: 270 TABLET | Refills: 3 | Status: SHIPPED | OUTPATIENT
Start: 2025-06-05

## 2025-06-18 DIAGNOSIS — G50.0 TRIGEMINAL NEURALGIA OF RIGHT SIDE OF FACE: ICD-10-CM

## 2025-06-18 RX ORDER — LAMOTRIGINE 25 MG/1
TABLET ORAL
Qty: 360 TABLET | Refills: 3 | Status: SHIPPED | OUTPATIENT
Start: 2025-06-18

## 2025-06-18 NOTE — TELEPHONE ENCOUNTER
Usha with [facility] is calling for a refill on Lamotrigine 25 MG tablets. She states they sent over something electronically on 6/14 but they have not received anything back from us.      [phone]  PHONE

## 2025-07-07 DIAGNOSIS — G50.0 TRIGEMINAL NEURALGIA OF RIGHT SIDE OF FACE: ICD-10-CM

## 2025-07-07 RX ORDER — GABAPENTIN 300 MG/1
CAPSULE ORAL
Qty: 270 CAPSULE | Refills: 0 | Status: SHIPPED | OUTPATIENT
Start: 2025-07-07 | End: 2026-07-07

## 2025-07-07 NOTE — TELEPHONE ENCOUNTER
Received fax from [facility] requesting refills on patients lamotrigine 25 mg tablets and gabapentin 300 mg tablets    Last office visit 04/07/2025  Next office visit 02/05/2026  Last med refill  Lamotrigine: 06/18/2025 (already filled)  Gabapentin: 12/04/2024 (written for 1 year supply but rx only good for 6 months d/t being controlled)

## 2025-08-01 ENCOUNTER — TELEMEDICINE (OUTPATIENT)
Facility: CLINIC | Age: 45
End: 2025-08-01
Payer: COMMERCIAL

## 2025-08-01 DIAGNOSIS — I83.893 VARICOSE VEINS OF BILATERAL LOWER EXTREMITIES WITH OTHER COMPLICATIONS: Primary | ICD-10-CM

## 2025-08-01 DIAGNOSIS — N92.1 MENORRHAGIA WITH IRREGULAR CYCLE: ICD-10-CM

## 2025-08-01 DIAGNOSIS — Z11.59 ENCOUNTER FOR HEPATITIS C SCREENING TEST FOR LOW RISK PATIENT: ICD-10-CM

## 2025-08-01 DIAGNOSIS — Z13.220 SCREENING CHOLESTEROL LEVEL: ICD-10-CM

## 2025-08-01 DIAGNOSIS — G50.0 TRIGEMINAL NEURALGIA OF RIGHT SIDE OF FACE: ICD-10-CM

## 2025-08-01 DIAGNOSIS — R73.9 HYPERGLYCEMIA: ICD-10-CM

## 2025-08-01 DIAGNOSIS — D25.0 SUBMUCOUS LEIOMYOMA OF UTERUS: ICD-10-CM

## 2025-08-01 PROCEDURE — 99214 OFFICE O/P EST MOD 30 MIN: CPT | Performed by: FAMILY MEDICINE

## 2025-08-01 NOTE — PROGRESS NOTES
Denisse Santillan, was evaluated through a synchronous (real-time) audio-video encounter. The patient (or guardian if applicable) is aware that this is a billable service, which includes applicable co-pays. This Virtual Visit was conducted with patient's (and/or legal guardian's) consent. Patient identification was verified, and a caregiver was present when appropriate.   The patient was located at Home: [address] [address]  Provider was located at Other: [place]  Confirm you are appropriately licensed, registered, or certified to deliver care in the state where the patient is located as indicated above. If you are not or unsure, please re-schedule the visit: Yes, I confirm.        Total time spent for this encounter: Not billed by time    --Demetris Tony MD on 8/1/2025 at 9:34 AM    An electronic signature was used to authenticate this note.    Denisse Santillan is a 45 y.o. female who presents to the office today for the following:  Chief Complaint   Patient presents with    Vaginal Bleeding       She has the following problems:  Patient Active Problem List   Diagnosis    Vitamin D deficiency    Spine pain, lumbar    Varicose veins of bilateral lower extremities with other complications    Trigeminal neuralgia of right side of face    Leiomyoma    Eczema    Menorrhagia    Venous stasis dermatitis    Anemia    Hernia of anterior abdominal wall    Pain in both lower extremities    Pain of both breasts    Hyperglycemia    Episode of shaking    Obesity    Cobalamin deficiency          Assessment & Plan  1. Menometrorrhagia.  - Reports heavy and irregular menstrual bleeding.  - Referral to interventional radiology made for evaluation and potential treatment of uterine fibroids.  - Currently taking iron pills twice a day to manage anemia associated with heavy bleeding.  - Complete blood count (CBC) will be ordered to monitor hemoglobin levels.    2. Varicose veins.  - Presence of varicose veins noted.  - Will

## 2025-08-21 DIAGNOSIS — G50.0 TRIGEMINAL NEURALGIA OF RIGHT SIDE OF FACE: ICD-10-CM

## 2025-08-21 RX ORDER — GABAPENTIN 300 MG/1
CAPSULE ORAL
Qty: 270 CAPSULE | Refills: 1 | OUTPATIENT
Start: 2025-08-21 | End: 2026-08-21

## 2025-08-28 DIAGNOSIS — G50.0 TRIGEMINAL NEURALGIA OF RIGHT SIDE OF FACE: ICD-10-CM

## 2025-08-28 RX ORDER — GABAPENTIN 300 MG/1
CAPSULE ORAL
Qty: 270 CAPSULE | Refills: 0 | OUTPATIENT
Start: 2025-08-28 | End: 2026-08-28